# Patient Record
Sex: FEMALE | Race: WHITE | Employment: FULL TIME | URBAN - METROPOLITAN AREA
[De-identification: names, ages, dates, MRNs, and addresses within clinical notes are randomized per-mention and may not be internally consistent; named-entity substitution may affect disease eponyms.]

---

## 2017-01-12 DIAGNOSIS — Z72.0 TOBACCO ABUSE: ICD-10-CM

## 2017-01-12 RX ORDER — VARENICLINE TARTRATE 0.5 (11)-1
KIT ORAL
Qty: 53 DOSE PACK | Refills: 0 | Status: SHIPPED | OUTPATIENT
Start: 2017-01-12 | End: 2019-07-09

## 2017-01-12 NOTE — TELEPHONE ENCOUNTER
Requested Prescriptions     Pending Prescriptions Disp Refills    CHANTIX STARTING MONTH BOX 0.5 mg (11)- 1 mg (42) DsPk [Pharmacy Med Name: [address] 53 Dose Pack 0     Sig: USE AS DIRECTED IN PACKAGED INDICATIONS: SMOKING CESSATION     Last OV-12/16/16  Next OV-None scheduled   Med refilled- Dated in system for 1/17/17 going out of town requesting refill for today      Patient states she needs to get her refill for her Chantix sooner than prescription is dated in system of 1/17/17. Patient states starter kit has run out & needs approval today please as patient states she is going out of town. Please call to discuss.  Thank you

## 2017-01-12 NOTE — TELEPHONE ENCOUNTER
Patient states she needs to get her refill for her Chantix sooner than prescription is dated in system of 1/17/17. Patient states starter kit has run out & needs approval today please as patient states she is going out of town. Please call to discuss.  Thank you

## 2017-12-11 ENCOUNTER — OFFICE VISIT (OUTPATIENT)
Dept: INTERNAL MEDICINE CLINIC | Age: 47
End: 2017-12-11

## 2017-12-11 VITALS
SYSTOLIC BLOOD PRESSURE: 106 MMHG | OXYGEN SATURATION: 100 % | DIASTOLIC BLOOD PRESSURE: 69 MMHG | WEIGHT: 127.6 LBS | RESPIRATION RATE: 16 BRPM | HEIGHT: 62 IN | HEART RATE: 70 BPM | TEMPERATURE: 97.8 F | BODY MASS INDEX: 23.48 KG/M2

## 2017-12-11 DIAGNOSIS — F41.9 ANXIETY: ICD-10-CM

## 2017-12-11 DIAGNOSIS — J01.00 ACUTE NON-RECURRENT MAXILLARY SINUSITIS: Primary | ICD-10-CM

## 2017-12-11 RX ORDER — LORAZEPAM 1 MG/1
TABLET ORAL
Qty: 30 TAB | Refills: 1 | Status: SHIPPED | OUTPATIENT
Start: 2017-12-11 | End: 2019-07-09 | Stop reason: SDUPTHER

## 2017-12-11 RX ORDER — LEVOFLOXACIN 500 MG/1
500 TABLET, FILM COATED ORAL DAILY
Qty: 10 TAB | Refills: 0 | Status: SHIPPED | OUTPATIENT
Start: 2017-12-11 | End: 2019-04-29

## 2017-12-11 NOTE — PROGRESS NOTES
Chief Complaint   Patient presents with    Sinus Infection     coughing green mucus, runny nose and sneezing     Visit Vitals    /69 (BP 1 Location: Left arm, BP Patient Position: Sitting)    Pulse 70    Temp 97.8 °F (36.6 °C) (Oral)    Resp 16    Ht 5' 1.5\" (1.562 m)    Wt 127 lb 9.6 oz (57.9 kg)    LMP 11/27/2017 (Approximate)    SpO2 100%    BMI 23.72 kg/m2     Reviewed record In preparation for visit and have obtained necessary documentation    1. Have you been to the ER, urgent care clinic since your last visit? Hospitalized since your last visit? NO    2. Have you seen or consulted any other health care providers outside of the [address] [place] [address] since your last visit? Include any pap smears or colon screening. NO    Patient does not have advance directive on file and has received paperwork.

## 2017-12-11 NOTE — MR AVS SNAPSHOT
[address] [place] [address] 
[phone] Patient: Vidhi Gordillo MRN: [id number] CCB:[birth year] Visit Information Date & Time Provider Department Dept. Phone Encounter #  
 12/11/2017  9:15 AM Becky Chavez, [address] [id number] Follow-up Instructions Return if symptoms worsen or fail to improve. Upcoming Health Maintenance Date Due Pneumococcal 19-64 Medium Risk (1 of 1 - PPSV23) 8/26/1990 DTaP/Tdap/Td series (1 - Tdap) 8/26/1992 PAP AKA CERVICAL CYTOLOGY 8/26/1992 Influenza Age 5 to Adult 8/1/2017 Allergies as of 12/11/2017  Review Complete On: 12/11/2017 By: Becky Chavez MD  
  
 Severity Noted Reaction Type Reactions Benadryl [Diphenhydramine Hcl] High 01/23/2013   Side Effect Other (comments) Increase of symptoms Aspirin  09/20/2010    Nausea and Vomiting Erythromycin  09/20/2010    Hives Pcn [Penicillins]  09/20/2010    Hives Tetracycline  09/20/2010    Hives Current Immunizations  Never Reviewed No immunizations on file. Not reviewed this visit You Were Diagnosed With   
  
 Codes Comments Acute non-recurrent maxillary sinusitis    -  Primary ICD-10-CM: J01.00 ICD-9-CM: 461.0 Anxiety     ICD-10-CM: F41.9 ICD-9-CM: 300.00 Vitals BP Pulse Temp Resp Height(growth percentile) Weight(growth percentile) 106/69 (BP 1 Location: Left arm, BP Patient Position: Sitting) 70 97.8 °F (36.6 °C) (Oral) 16 5' 1.5\" (1.562 m) 127 lb 9.6 oz (57.9 kg) LMP SpO2 BMI OB Status Smoking Status 11/27/2017 (Approximate) 100% 23.72 kg/m2 Having regular periods Former Smoker BMI and BSA Data Body Mass Index Body Surface Area  
 23.72 kg/m 2 1.59 m 2 Preferred Pharmacy Pharmacy Name Phone  [address] [facility] [phone] Your Updated Medication List  
  
   
This list is accurate as of: 12/11/17  9:50 AM.  Always use your most recent med list.  
  
  
  
  
 levoFLOXacin 500 mg tablet Commonly known as:  Thersia Bring Take 1 Tab by mouth daily. LORazepam 1 mg tablet Commonly known as:  ATIVAN Take 1/2-1 tablet three times a day as needed * varenicline 0.5 mg (11)- 1 mg (42) Dspk Commonly known as:  [address] Use as directed * CHANTIX STARTING MONTH BOX 0.5 mg (11)- 1 mg (42) Dspk Generic drug:  varenicline USE AS DIRECTED IN PACKAGED INDICATIONS: SMOKING CESSATION  
  
 * Notice: This list has 2 medication(s) that are the same as other medications prescribed for you. Read the directions carefully, and ask your doctor or other care provider to review them with you. Prescriptions Printed Refills LORazepam (ATIVAN) 1 mg tablet 1 Sig: Take 1/2-1 tablet three times a day as needed Class: Print Prescriptions Sent to Pharmacy Refills  
 levoFLOXacin (LEVAQUIN) 500 mg tablet 0 Sig: Take 1 Tab by mouth daily. Class: Normal  
 Pharmacy: [facility] [address] #: [phone] Route: Oral  
  
Follow-up Instructions Return if symptoms worsen or fail to improve. Introducing [facility]! Ariel Liu introduces [employer] patient portal. Now you can access parts of your medical record, email your doctor's office, and request medication refills online. 1. In your internet browser, go to [email or web address] 2. Click on the First Time User? Click Here link in the Sign In box. You will see the New Member Sign Up page. 3. Enter your [employer] Access Code exactly as it appears below. You will not need to use this code after youve completed the sign-up process.  If you do not sign up before the expiration date, you must request a new code. · [employer] Access Code: [email or web address] Expires: 3/11/2018  9:50 AM 
 
4. Enter the last four digits of your Social Security Number (xxxx) and Date of Birth (mm/dd/yyyy) as indicated and click Submit. You will be taken to the next sign-up page. 5. Create a [employer] ID. This will be your [employer] login ID and cannot be changed, so think of one that is secure and easy to remember. 6. Create a [employer] password. You can change your password at any time. 7. Enter your Password Reset Question and Answer. This can be used at a later time if you forget your password. 8. Enter your e-mail address. You will receive e-mail notification when new information is available in [address] 9. Click Sign Up. You can now view and download portions of your medical record. 10. Click the Download Summary menu link to download a portable copy of your medical information. If you have questions, please visit the Frequently Asked Questions section of the [employer] website. Remember, [employer] is NOT to be used for urgent needs. For medical emergencies, dial 911. Now available from your iPhone and Android! Please provide this summary of care documentation to your next provider. Your primary care clinician is listed as Vonda Ridley. If you have any questions after today's visit, please call [phone].

## 2019-04-22 ENCOUNTER — TELEPHONE (OUTPATIENT)
Dept: INTERNAL MEDICINE CLINIC | Age: 49
End: 2019-04-22

## 2019-04-22 NOTE — TELEPHONE ENCOUNTER
Spoke with patient. Two pt identifiers confirmed. Patient advised that she will need to be seen before clearance can be given, has not been in the office since 12/2017. Patient offered an appointment with Dr. Antonette Beaulieu on 04/29/19. Patient accepted. Patient advised if anything changes or if unable to keep this appointment to call the office  Pt verbalized understanding of information discussed w/ no further questions at this time.

## 2019-04-22 NOTE — TELEPHONE ENCOUNTER
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 Patient reports this will not effect her medically but the person doing the massage is requiring clearance from patient's PCP. Please call to discuss.  Thank you

## 2019-04-29 ENCOUNTER — OFFICE VISIT (OUTPATIENT)
Dept: INTERNAL MEDICINE CLINIC | Age: 49
End: 2019-04-29

## 2019-04-29 VITALS
RESPIRATION RATE: 16 BRPM | HEART RATE: 64 BPM | TEMPERATURE: 97.9 F | DIASTOLIC BLOOD PRESSURE: 73 MMHG | OXYGEN SATURATION: 100 % | SYSTOLIC BLOOD PRESSURE: 115 MMHG | BODY MASS INDEX: 25.32 KG/M2 | WEIGHT: 137.6 LBS | HEIGHT: 62 IN

## 2019-04-29 DIAGNOSIS — M75.42 IMPINGEMENT SYNDROME OF LEFT SHOULDER: Primary | ICD-10-CM

## 2019-04-29 DIAGNOSIS — B36.0 TINEA VERSICOLOR: ICD-10-CM

## 2019-04-29 RX ORDER — FLUCONAZOLE 150 MG/1
TABLET ORAL
Qty: 4 TAB | Refills: 0 | Status: SHIPPED | OUTPATIENT
Start: 2019-04-29 | End: 2019-07-09

## 2019-04-29 NOTE — LETTER
NOTIFICATION RETURN TO WORK / SCHOOL 
 
4/29/2019 5:07 PM 
 
Ms. Grisel Cash [address] [id number] To Whom It May Concern: Grisel Cash is currently under the care of [facility]. She was seen in the office today for follow up. She is medically cleared for massage therapy, including muscle, fascia, lymphatic drainage massage. She is medically cleared for acupuncture and cupping therapy. If there are questions or concerns please have the patient contact our office.  
 
 
 
Sincerely, 
 
 
Payton An MD

## 2019-04-29 NOTE — PROGRESS NOTES
Homar Mcleod is a 53 y.o. female who presents for follow up. Seen at [facility], using CBD oil for pain. Had massage of the left shoulder and wants to work with massage therapist, but needed medical clearance per therapist due to her history of congenital bone, Harshad Erick syndrome. Reports fracture of left wrist with skating fall in the past. Prior skull and neck surgery. Saw ortho, Dr. Aubery Sandifer, for left shoulder pain. Has impingement syndrome. Given steroid shot last week. To start PT this week. No MRI yet. Normal monthly menses. Painful menses. Has tinea versicolor, no relief with nizoral or selenium. Past Medical History:  
Diagnosis Date  Anxiety  Polyostotic fibrous dysplasia of bone History reviewed. No pertinent family history. Social History Socioeconomic History  Marital status: SINGLE Spouse name: Not on file  Number of children: Not on file  Years of education: Not on file  Highest education level: Not on file Occupational History  Not on file Social Needs  Financial resource strain: Not on file  Food insecurity:  
  Worry: Not on file Inability: Not on file  Transportation needs:  
  Medical: Not on file Non-medical: Not on file Tobacco Use  Smoking status: Former Smoker Packs/day: 0.50 Last attempt to quit: 2017 Years since quittin.9  Smokeless tobacco: Never Used Substance and Sexual Activity  Alcohol use: Yes Alcohol/week: 0.6 oz Types: 1 Glasses of wine per week Comment: Once every two weeks  Drug use: No  
 Sexual activity: Never Partners: Male Birth control/protection: None Lifestyle  Physical activity:  
  Days per week: Not on file Minutes per session: Not on file  Stress: Not on file Relationships  Social connections:  
  Talks on phone: Not on file Gets together: Not on file Attends Hinduism service: Not on file Active member of club or organization: Not on file Attends meetings of clubs or organizations: Not on file Relationship status: Not on file  Intimate partner violence:  
  Fear of current or ex partner: Not on file Emotionally abused: Not on file Physically abused: Not on file Forced sexual activity: Not on file Other Topics Concern  Not on file Social History Narrative  Not on file Current Outpatient Medications on File Prior to Visit Medication Sig Dispense Refill  LORazepam (ATIVAN) 1 mg tablet Take 1/2-1 tablet three times a day as needed 30 Tab 1  
 CHANTIX STARTING MONTH BOX 0.5 mg (11)- 1 mg (42) DsPk USE AS DIRECTED IN PACKAGED INDICATIONS: SMOKING CESSATION 53 Dose Pack 0  
 varenicline (CHANTIX STARTING MONTH BOX) 0.5 mg (11)- 1 mg (42) DsPk Use as directed 1 Dose Pack 0 No current facility-administered medications on file prior to visit. Review of Systems Pertinent items are noted in HPI. Objective:  
 
Visit Vitals /73 (BP 1 Location: Left arm, BP Patient Position: Sitting) Pulse 64 Temp 97.9 °F (36.6 °C) (Oral) Resp 16 Ht 5' 1.5\" (1.562 m) Wt 137 lb 9.6 oz (62.4 kg) LMP 04/15/2019 (Approximate) SpO2 100% BMI 25.58 kg/m² Gen: well appearing female Resp:  No wheezing, no rhonchi, no rales. CV:  RRR, normal S1S2 Extrem:  +2 pulses, no edema, warm distally, good ROM in left shoulder without pain. Skin: hypopigmented macular eruption on chest and back. Assessment/Plan: ICD-10-CM ICD-9-CM 1. Impingement syndrome of left shoulder M75.42 726.2 2. Tinea versicolor B36.0 111.0 fluconazole (DIFLUCAN) 150 mg tablet Follow up with ortho regarding left shoulder. Given clearance letter for massage therapy.   
 
 
Hermelinda Pulido MD

## 2019-07-05 ENCOUNTER — TELEPHONE (OUTPATIENT)
Dept: INTERNAL MEDICINE CLINIC | Age: 49
End: 2019-07-05

## 2019-07-05 NOTE — TELEPHONE ENCOUNTER
Pt is requesting a call back in regards to hair falling out for a month and a half.  Best contact [phone]     Message received & copied from [facility]

## 2019-07-08 NOTE — TELEPHONE ENCOUNTER
Called and spoke to patient. Patient states that her hair has been falling out . She has lost like 25lbs and not sure if it is from that or the KEto diet she is one.

## 2019-07-09 ENCOUNTER — TELEPHONE (OUTPATIENT)
Dept: INTERNAL MEDICINE CLINIC | Age: 49
End: 2019-07-09

## 2019-07-09 ENCOUNTER — OFFICE VISIT (OUTPATIENT)
Dept: INTERNAL MEDICINE CLINIC | Age: 49
End: 2019-07-09

## 2019-07-09 VITALS
TEMPERATURE: 97.4 F | HEART RATE: 62 BPM | HEIGHT: 62 IN | RESPIRATION RATE: 16 BRPM | WEIGHT: 126 LBS | SYSTOLIC BLOOD PRESSURE: 103 MMHG | DIASTOLIC BLOOD PRESSURE: 57 MMHG | BODY MASS INDEX: 23.19 KG/M2 | OXYGEN SATURATION: 100 %

## 2019-07-09 DIAGNOSIS — L65.9 HAIR LOSS: Primary | ICD-10-CM

## 2019-07-09 DIAGNOSIS — L65.9 HAIR LOSS: ICD-10-CM

## 2019-07-09 DIAGNOSIS — E55.9 VITAMIN D DEFICIENCY: ICD-10-CM

## 2019-07-09 DIAGNOSIS — F41.9 ANXIETY: ICD-10-CM

## 2019-07-09 DIAGNOSIS — Z00.00 ROUTINE MEDICAL EXAM: Primary | ICD-10-CM

## 2019-07-09 RX ORDER — LORAZEPAM 1 MG/1
TABLET ORAL
Qty: 30 TAB | Refills: 1 | Status: SHIPPED | OUTPATIENT
Start: 2019-07-09 | End: 2020-04-09 | Stop reason: SDUPTHER

## 2019-07-09 NOTE — PROGRESS NOTES
Chelita Sierra is a 53 y.o. female who presents with concern of hair loss. Onset  1 and 1/2 months. The hair loss is diffusely. No scalp rash. no change in hair processing or change in hair care regimen. Normal monthly menses. Reports under more stress at work, 50-60 hours at work. Working weight loss 23# with keto diet and exercise. On vitamin D, biotin, b complex, multivitamin for 3 weeks. Past Medical History:   Diagnosis Date    Anxiety     Polyostotic fibrous dysplasia of bone        History reviewed. No pertinent family history. Social History     Socioeconomic History    Marital status: SINGLE     Spouse name: Not on file    Number of children: Not on file    Years of education: Not on file    Highest education level: Not on file   Occupational History    Not on file   Social Needs    Financial resource strain: Not on file    Food insecurity:     Worry: Not on file     Inability: Not on file    Transportation needs:     Medical: Not on file     Non-medical: Not on file   Tobacco Use    Smoking status: Former Smoker     Packs/day: 0.50     Last attempt to quit: 2017     Years since quittin.1    Smokeless tobacco: Never Used   Substance and Sexual Activity    Alcohol use:  Yes     Alcohol/week: 0.6 oz     Types: 1 Glasses of wine per week     Comment: Once every two weeks    Drug use: No    Sexual activity: Yes     Partners: Male     Birth control/protection: None   Lifestyle    Physical activity:     Days per week: Not on file     Minutes per session: Not on file    Stress: Not on file   Relationships    Social connections:     Talks on phone: Not on file     Gets together: Not on file     Attends Anabaptist service: Not on file     Active member of club or organization: Not on file     Attends meetings of clubs or organizations: Not on file     Relationship status: Not on file    Intimate partner violence:     Fear of current or ex partner: Not on file Emotionally abused: Not on file     Physically abused: Not on file     Forced sexual activity: Not on file   Other Topics Concern    Not on file   Social History Narrative    Not on file       No current outpatient medications on file prior to visit. No current facility-administered medications on file prior to visit. Review of Systems  Pertinent items are noted in HPI. Objective:     Visit Vitals  /57 (BP 1 Location: Left arm, BP Patient Position: Sitting)   Pulse 62   Temp 97.4 °F (36.3 °C) (Oral)   Resp 16   Ht 5' 1.5\" (1.562 m)   Wt 126 lb (57.2 kg)   LMP 07/09/2019 (Approximate)   SpO2 100%   BMI 23.42 kg/m²     Gen: well appearing female  HEENT:   Scalp hair thinning, temporal hair loss, no patchy hair loss, no scalp rash, PERRL,normal conjunctiva. OP no erythema, no exudates, MMM  Neck:  . No masses or LAD  Resp:  No wheezing, no rhonchi, no rales. CV:  RRR, normal S1S2, no murmur. Assessment/Plan:       ICD-10-CM ICD-9-CM    1. Hair loss L65.9 704.00    2. Anxiety F41.9 300.00 LORazepam (ATIVAN) 1 mg tablet   Vitamin D3 2,000 iu daily. Biotin. b complex    Follow-up and Dispositions    · Return for follow up pending labs.  Felipe Garcia MD

## 2019-07-09 NOTE — PATIENT INSTRUCTIONS
Office Policies Phone calls/patient messages: Please allow up to 24 hours for someone in the office to contact you about your call or message. Be mindful your provider may be out of the office or your message may require further review. We encourage you to use [employer] for your messages as this is a faster, more efficient way to communicate with our office Medication Refills: 
         
Prescription medications require 48-72 business hours to process. We encourage you to use [employer] for your refills. For controlled medications: Please allow 72 business hours to process. Certain medications may require you to  a written prescription at our office. NO narcotic/controlled medications will be prescribed after 4pm Monday through Friday or on weekends Form/Paperwork Completion: 
         
Please note a $25 fee may incur for all paperwork for completed by our providers. We ask that you allow 7-10 business days. Pre-payment is due prior to picking up/faxing the completed form. You may also download your forms to [employer] to have your doctor print off. Vitamin D3 2,000 iu daily. Biotin. b complex

## 2019-07-10 ENCOUNTER — TELEPHONE (OUTPATIENT)
Dept: INTERNAL MEDICINE CLINIC | Age: 49
End: 2019-07-10

## 2019-07-10 LAB
25(OH)D3+25(OH)D2 SERPL-MCNC: 41.4 NG/ML (ref 30–100)
ALBUMIN SERPL-MCNC: 4.8 G/DL (ref 3.5–5.5)
ALBUMIN/GLOB SERPL: 2.5 {RATIO} (ref 1.2–2.2)
ALP SERPL-CCNC: 59 IU/L (ref 39–117)
ALT SERPL-CCNC: 12 IU/L (ref 0–32)
APPEARANCE UR: CLEAR
AST SERPL-CCNC: 15 IU/L (ref 0–40)
BILIRUB SERPL-MCNC: 0.6 MG/DL (ref 0–1.2)
BILIRUB UR QL STRIP: NEGATIVE
BUN SERPL-MCNC: 14 MG/DL (ref 6–24)
BUN/CREAT SERPL: 24 (ref 9–23)
CALCIUM SERPL-MCNC: 9.5 MG/DL (ref 8.7–10.2)
CHLORIDE SERPL-SCNC: 105 MMOL/L (ref 96–106)
CHOLEST SERPL-MCNC: 209 MG/DL (ref 100–199)
CO2 SERPL-SCNC: 22 MMOL/L (ref 20–29)
COLOR UR: YELLOW
CREAT SERPL-MCNC: 0.59 MG/DL (ref 0.57–1)
DHEA-S SERPL-MCNC: 176.8 UG/DL (ref 41.2–243.7)
ERYTHROCYTE [DISTWIDTH] IN BLOOD BY AUTOMATED COUNT: 13.6 % (ref 12.3–15.4)
FERRITIN SERPL-MCNC: 71 NG/ML (ref 15–150)
GLOBULIN SER CALC-MCNC: 1.9 G/DL (ref 1.5–4.5)
GLUCOSE SERPL-MCNC: 93 MG/DL (ref 65–99)
GLUCOSE UR QL: NEGATIVE
HCT VFR BLD AUTO: 40.3 % (ref 34–46.6)
HDLC SERPL-MCNC: 64 MG/DL
HGB BLD-MCNC: 12.8 G/DL (ref 11.1–15.9)
HGB UR QL STRIP: NEGATIVE
KETONES UR QL STRIP: ABNORMAL
LDLC SERPL CALC-MCNC: 135 MG/DL (ref 0–99)
LEUKOCYTE ESTERASE UR QL STRIP: NEGATIVE
MCH RBC QN AUTO: 31.2 PG (ref 26.6–33)
MCHC RBC AUTO-ENTMCNC: 31.8 G/DL (ref 31.5–35.7)
MCV RBC AUTO: 98 FL (ref 79–97)
MICRO URNS: ABNORMAL
NITRITE UR QL STRIP: NEGATIVE
PH UR STRIP: 5.5 [PH] (ref 5–7.5)
PLATELET # BLD AUTO: 283 X10E3/UL (ref 150–450)
POTASSIUM SERPL-SCNC: 4.6 MMOL/L (ref 3.5–5.2)
PROT SERPL-MCNC: 6.7 G/DL (ref 6–8.5)
PROT UR QL STRIP: NEGATIVE
RBC # BLD AUTO: 4.1 X10E6/UL (ref 3.77–5.28)
SODIUM SERPL-SCNC: 144 MMOL/L (ref 134–144)
SP GR UR: 1.02 (ref 1–1.03)
T4 FREE SERPL-MCNC: 1.17 NG/DL (ref 0.82–1.77)
TESTOST FREE SERPL-MCNC: 0.7 PG/ML (ref 0–4.2)
TESTOST SERPL-MCNC: 12 NG/DL (ref 8–48)
TRIGL SERPL-MCNC: 48 MG/DL (ref 0–149)
TSH SERPL DL<=0.005 MIU/L-ACNC: 1.72 UIU/ML (ref 0.45–4.5)
UROBILINOGEN UR STRIP-MCNC: 0.2 MG/DL (ref 0.2–1)
VLDLC SERPL CALC-MCNC: 10 MG/DL (ref 5–40)
WBC # BLD AUTO: 6.1 X10E3/UL (ref 3.4–10.8)

## 2019-07-11 NOTE — TELEPHONE ENCOUNTER
Notify patient all labs normal. No sign of nutritional deficiencies or hormonal imbalance to cause hair loss. Vitamin D normal, but can continue vitamin D 2,000 iu daily to increase to higher normal range. Can continue biotin and B complex. Can use over the counter minoxidil 2% (rogaine) twice a day, can take several months to notice improvement. It may or may not be effective since the hair loss is more diffuse and not a male pattern hair loss. Avoid overprocessing.

## 2020-04-09 DIAGNOSIS — F41.9 ANXIETY: ICD-10-CM

## 2020-04-09 RX ORDER — LORAZEPAM 1 MG/1
TABLET ORAL
Qty: 30 TAB | Refills: 1 | Status: SHIPPED | OUTPATIENT
Start: 2020-04-09

## 2020-04-09 NOTE — TELEPHONE ENCOUNTER
Patient states she needs refill done for her anxiety. Last prescription  before she could use last refill. Please call if any questions. Thank you    Pharmacy is [facility], [address] indicated.

## 2020-07-07 ENCOUNTER — VIRTUAL VISIT (OUTPATIENT)
Dept: INTERNAL MEDICINE CLINIC | Age: 50
End: 2020-07-07

## 2020-07-07 DIAGNOSIS — F51.01 PRIMARY INSOMNIA: Primary | ICD-10-CM

## 2020-07-07 RX ORDER — ZOLPIDEM TARTRATE 5 MG/1
5 TABLET ORAL
Qty: 30 TAB | Refills: 1 | Status: SHIPPED | OUTPATIENT
Start: 2020-07-07

## 2020-07-07 NOTE — PATIENT INSTRUCTIONS

## 2020-07-07 NOTE — PROGRESS NOTES
Follow-up and Dispositions    · Return if symptoms worsen or fail to improve, for schedule physical and fasting labs. Diane Valadez is a 51 y.o. female who presents with concern of insomnia. Started with pandemic. More anxious. Taking lorazepam for anxiety. Has taken 1/2 lorazepam at bedtime, will fall asleep, but awakens. With 1 mg tablet, too groggy. Tried melatonin 10mg last night 30 minutes prior to bedtime, took 2 hours to fall asleep. This is an established visit conducted via telemedicine, by phone. The patient has been instructed that this meets HIPAA criteria and acknowledges and agrees to this method of visitation. Pursuant to the emergency declaration under the [facility]5 waiver authority and the [employer] Act, this Virtual Visit was conducted, with patient's consent, to reduce the patient's risk of exposure to COVID-19 and provide continuity of care for an established patient. Services were provided by phone to substitute for in-person clinic visit. Past Medical History:   Diagnosis Date    Anxiety     Polyostotic fibrous dysplasia of bone        No family history on file. Social History     Socioeconomic History    Marital status: SINGLE     Spouse name: Not on file    Number of children: Not on file    Years of education: Not on file    Highest education level: Not on file   Occupational History    Not on file   Social Needs    Financial resource strain: Not on file    Food insecurity     Worry: Not on file     Inability: Not on file    Transportation needs     Medical: Not on file     Non-medical: Not on file   Tobacco Use    Smoking status: Former Smoker     Packs/day: 0.50     Last attempt to quit: 6/1/2017     Years since quitting: 3.1    Smokeless tobacco: Never Used   Substance and Sexual Activity    Alcohol use:  Yes     Alcohol/week: 1.0 standard drinks     Types: 1 Glasses of wine per week     Comment: Once every two weeks    Drug use: No    Sexual activity: Yes     Partners: Male     Birth control/protection: None   Lifestyle    Physical activity     Days per week: Not on file     Minutes per session: Not on file    Stress: Not on file   Relationships    Social connections     Talks on phone: Not on file     Gets together: Not on file     Attends Holiness service: Not on file     Active member of club or organization: Not on file     Attends meetings of clubs or organizations: Not on file     Relationship status: Not on file    Intimate partner violence     Fear of current or ex partner: Not on file     Emotionally abused: Not on file     Physically abused: Not on file     Forced sexual activity: Not on file   Other Topics Concern    Not on file   Social History Narrative    Not on file       Current Outpatient Medications on File Prior to Visit   Medication Sig Dispense Refill    LORazepam (Ativan) 1 mg tablet Take 1/2-1 tablet three times a day as needed 30 Tab 1     No current facility-administered medications on file prior to visit. Review of Systems  Pertinent items are noted in HPI. Objective:     Gen: healthy sounding female  HEENT: no audible congestion, patient does not see oral erythema and sees MMM  Neck: patient does not feel enlarged or tender LAD or masses  Resp: normal respiratory effort, no audible wheezing. CV: patient does not feel palpitations or heart irregularity  Abd: patient does not feel abdominal tenderness or mass, patient does not notice distension  Extrem: patient does not see swelling in ankles or joints. Neuro: Alert and oriented, able to answer questions without difficulty      Assessment/Plan:       ICD-10-CM ICD-9-CM    1. Primary insomnia F51.01 307.42 zolpidem (AMBIEN) 5 mg tablet   discussed sleep hygiene, sent info. This was a telemedicine visit by phone, duration 14 minutes.          Elena Christina MD Jar[facility]    Follow-up and Dispositions    · Return if symptoms worsen or fail to improve, for schedule physical and fasting labs. Cm Rubio

## 2021-03-30 ENCOUNTER — VIRTUAL VISIT (OUTPATIENT)
Dept: INTERNAL MEDICINE CLINIC | Age: 51
End: 2021-03-30
Payer: COMMERCIAL

## 2021-03-30 DIAGNOSIS — M54.50 ACUTE BILATERAL LOW BACK PAIN WITHOUT SCIATICA: Primary | ICD-10-CM

## 2021-03-30 PROCEDURE — 99213 OFFICE O/P EST LOW 20 MIN: CPT | Performed by: FAMILY MEDICINE

## 2021-03-30 RX ORDER — IBUPROFEN 600 MG/1
TABLET ORAL
COMMUNITY

## 2021-03-30 RX ORDER — METHYLPREDNISOLONE 4 MG/1
4 TABLET ORAL
Qty: 1 DOSE PACK | Refills: 0 | Status: SHIPPED | OUTPATIENT
Start: 2021-03-30

## 2021-03-30 RX ORDER — CYCLOBENZAPRINE HCL 5 MG
5-10 TABLET ORAL
Qty: 20 TAB | Refills: 0 | Status: SHIPPED | OUTPATIENT
Start: 2021-03-30

## 2021-03-30 NOTE — PROGRESS NOTES
Ruth Keen is a 53 y.o. female who presents with concern of low back pain. The patient states she was exercising and she injured her back. Onset 4-5 days ago. Reports pain in lower back bilaterally. No sciatica. Tried ibuprofen- no relief, stretching- some relief. Worse with sitting for work. This is an established visit conducted via telemedicine with video. The patient has been instructed that this meets HIPAA criteria and acknowledges and agrees to this method of visitation. Pursuant to the emergency declaration under the [place], [facility] waiver authority and the [employer] Act, this Virtual Visit was conducted, with patient's consent, to reduce the patient's risk of exposure to COVID-19 and provide continuity of care for an established patient. Services were provided through a video synchronous discussion virtually to substitute for in-person clinic visit. Past Medical History:   Diagnosis Date    Anxiety     Polyostotic fibrous dysplasia of bone        History reviewed. No pertinent family history. Social History     Socioeconomic History    Marital status: SINGLE     Spouse name: Not on file    Number of children: Not on file    Years of education: Not on file    Highest education level: Not on file   Occupational History    Not on file   Social Needs    Financial resource strain: Not on file    Food insecurity     Worry: Not on file     Inability: Not on file    Transportation needs     Medical: Not on file     Non-medical: Not on file   Tobacco Use    Smoking status: Former Smoker     Packs/day: 0.50     Years: 15.00     Pack years: 7.50     Types: Cigarettes     Quit date: 6/1/2017     Years since quitting: 3.8    Smokeless tobacco: Never Used   Substance and Sexual Activity    Alcohol use:  Yes     Alcohol/week: 1.0 standard drinks     Types: 1 Glasses of wine per week Comment: Once every two weeks    Drug use: No    Sexual activity: Yes     Partners: Male     Birth control/protection: None   Lifestyle    Physical activity     Days per week: Not on file     Minutes per session: Not on file    Stress: Not on file   Relationships    Social connections     Talks on phone: Not on file     Gets together: Not on file     Attends Confucianism service: Not on file     Active member of club or organization: Not on file     Attends meetings of clubs or organizations: Not on file     Relationship status: Not on file    Intimate partner violence     Fear of current or ex partner: Not on file     Emotionally abused: Not on file     Physically abused: Not on file     Forced sexual activity: Not on file   Other Topics Concern    Not on file   Social History Narrative    Not on file       Current Outpatient Medications on File Prior to Visit   Medication Sig Dispense Refill    ibuprofen (MOTRIN) 600 mg tablet Take  by mouth every six (6) hours as needed for Pain.  zolpidem (AMBIEN) 5 mg tablet Take 1 Tab by mouth nightly as needed for Sleep. Max Daily Amount: 5 mg. 30 Tab 1    LORazepam (Ativan) 1 mg tablet Take 1/2-1 tablet three times a day as needed 30 Tab 1     No current facility-administered medications on file prior to visit. Review of Systems  Pertinent items are noted in HPI. Objective:     Gen: well appearing female  Resp: normal respiratory effort, no audible wheezing. CV: patient does not feel palpitations or heart irregularity  Neuro: Alert and oriented, able to answer questions without difficulty, able to walk without difficulty  Back-low back pain with movement and prolonged sitting    Assessment/Plan:       ICD-10-CM ICD-9-CM    1. Acute bilateral low back pain without sciatica  M54.5 724.2 methylPREDNISolone (MEDROL DOSEPACK) 4 mg tablet     338.19 cyclobenzaprine (FLEXERIL) 5 mg tablet   Recommend low back stretches. Use heat.  Steroid course and muscle relaxant given. This was a telemedicine visit with video.         Deon Raphael MD

## 2021-04-24 ENCOUNTER — IMMUNIZATION (OUTPATIENT)
Dept: INTERNAL MEDICINE CLINIC | Age: 51
End: 2021-04-24
Payer: COMMERCIAL

## 2021-04-24 DIAGNOSIS — Z23 ENCOUNTER FOR IMMUNIZATION: Primary | ICD-10-CM

## 2021-04-24 PROCEDURE — 91300 COVID-19, MRNA, LNP-S, PF, 30MCG/0.3ML DOSE(PFIZER): CPT | Performed by: FAMILY MEDICINE

## 2021-04-24 PROCEDURE — 0001A COVID-19, MRNA, LNP-S, PF, 30MCG/0.3ML DOSE(PFIZER): CPT | Performed by: FAMILY MEDICINE

## 2021-05-15 ENCOUNTER — IMMUNIZATION (OUTPATIENT)
Dept: INTERNAL MEDICINE CLINIC | Age: 51
End: 2021-05-15
Payer: COMMERCIAL

## 2021-05-15 DIAGNOSIS — Z23 ENCOUNTER FOR IMMUNIZATION: Primary | ICD-10-CM

## 2021-05-15 PROCEDURE — 91300 COVID-19, MRNA, LNP-S, PF, 30MCG/0.3ML DOSE(PFIZER): CPT | Performed by: FAMILY MEDICINE

## 2021-05-15 PROCEDURE — 0002A COVID-19, MRNA, LNP-S, PF, 30MCG/0.3ML DOSE(PFIZER): CPT | Performed by: FAMILY MEDICINE

## 2022-06-15 ENCOUNTER — HOSPITAL ENCOUNTER (OUTPATIENT)
Dept: MAMMOGRAPHY | Age: 52
Discharge: HOME | End: 2022-06-15
Payer: COMMERCIAL

## 2022-06-15 ENCOUNTER — TRANSCRIBE ORDER (OUTPATIENT)
Dept: GENERAL RADIOLOGY | Age: 52
End: 2022-06-15

## 2022-06-15 DIAGNOSIS — Z12.31 BREAST CANCER SCREENING BY MAMMOGRAM: Primary | ICD-10-CM

## 2022-06-15 DIAGNOSIS — Z12.31 BREAST CANCER SCREENING BY MAMMOGRAM: ICD-10-CM

## 2022-06-15 PROCEDURE — 77067 SCR MAMMO BI INCL CAD: CPT

## 2022-10-31 ENCOUNTER — OFFICE VISIT (OUTPATIENT)
Dept: INTERNAL MEDICINE CLINIC | Age: 52
End: 2022-10-31
Payer: COMMERCIAL

## 2022-10-31 VITALS
TEMPERATURE: 98.1 F | HEIGHT: 61 IN | HEART RATE: 73 BPM | RESPIRATION RATE: 16 BRPM | DIASTOLIC BLOOD PRESSURE: 73 MMHG | OXYGEN SATURATION: 97 % | WEIGHT: 126 LBS | SYSTOLIC BLOOD PRESSURE: 114 MMHG | BODY MASS INDEX: 23.79 KG/M2

## 2022-10-31 DIAGNOSIS — R35.0 FREQUENCY OF URINATION: Primary | ICD-10-CM

## 2022-10-31 DIAGNOSIS — N30.00 ACUTE CYSTITIS WITHOUT HEMATURIA: ICD-10-CM

## 2022-10-31 DIAGNOSIS — R39.15 URGENCY OF URINATION: ICD-10-CM

## 2022-10-31 LAB
BILIRUB UR QL STRIP: NEGATIVE
GLUCOSE UR-MCNC: NEGATIVE MG/DL
KETONES P FAST UR STRIP-MCNC: NEGATIVE MG/DL
PH UR STRIP: 5.5 [PH] (ref 4.6–8)
PROT UR QL STRIP: NEGATIVE
SP GR UR STRIP: 1 (ref 1–1.03)
UA UROBILINOGEN AMB POC: NORMAL (ref 0.2–1)
URINALYSIS CLARITY POC: CLEAR
URINALYSIS COLOR POC: NORMAL
URINE BLOOD POC: NORMAL
URINE LEUKOCYTES POC: NEGATIVE
URINE NITRITES POC: NEGATIVE

## 2022-10-31 PROCEDURE — 81002 URINALYSIS NONAUTO W/O SCOPE: CPT | Performed by: STUDENT IN AN ORGANIZED HEALTH CARE EDUCATION/TRAINING PROGRAM

## 2022-10-31 PROCEDURE — 99213 OFFICE O/P EST LOW 20 MIN: CPT | Performed by: STUDENT IN AN ORGANIZED HEALTH CARE EDUCATION/TRAINING PROGRAM

## 2022-10-31 RX ORDER — NITROFURANTOIN 25; 75 MG/1; MG/1
100 CAPSULE ORAL 2 TIMES DAILY
Qty: 10 CAPSULE | Refills: 0 | Status: SHIPPED | OUTPATIENT
Start: 2022-10-31

## 2022-10-31 NOTE — PROGRESS NOTES
1. \"Have you been to the ER, urgent care clinic since your last visit? Hospitalized since your last visit? \" Yes  Back pain     2. \"Have you seen or consulted any other health care providers outside of the [address] [place] [address] since your last visit? \" No     3. For patients aged 39-70: Has the patient had a colonoscopy / FIT/ Cologuard? No      If the patient is female:    4. For patients aged 41-77: Has the patient had a mammogram within the past 2 years? Yes - Care Gap present. Most recent result on file      5. For patients aged 21-65: Has the patient had a pap smear?  No

## 2022-10-31 NOTE — PROGRESS NOTES
[facility]   Internal Medicine  [address], [address] [facility]  [address]  [place], [address] [facility] [facility]  [phone]      Primary Care Visit Note    Assessment/Plan:     Diagnoses and all orders for this visit:    1. Frequency of urination  2. Urgency of urination  3. Acute cystitis without hematuria  -     AMB POC URINALYSIS DIP STICK MANUAL W/O MICRO - was negative in the office but positive on her home test  -     nitrofurantoin, macrocrystal-monohydrate, (MACROBID) 100 mg capsule; Take 1 Capsule by mouth two (2) times a day. -     CULTURE, URINE; Future    Gustavo Miguel MD    CC:     Chief Complaint   Patient presents with    Urinary Frequency       HPI:     Td Blair is a 47 y.o. female who presents for evaluation of urinary symptoms. - pt experiencing burning with urination, frequency since this morning.  - she has had UTIs in the past and she is certain re the sx  - no vaginal discharge, she does not have yeast infection sx  - no change in urine odor, color, no blood  - She is not currently sexually active. - she took a home UA kit which was positive      ROS:   All 10 point review of systems negative except those mentioned in the HPI. Past Medical History: Active Ambulatory Problems     Diagnosis Date Noted    Anxiety 09/04/2015     Resolved Ambulatory Problems     Diagnosis Date Noted    No Resolved Ambulatory Problems     Past Medical History:   Diagnosis Date    Polyostotic fibrous dysplasia of bone           Current Medications:     Current Outpatient Medications:     LORazepam (Ativan) 1 mg tablet, Take 1/2-1 tablet three times a day as needed, Disp: 30 Tab, Rfl: 1    ibuprofen (MOTRIN) 600 mg tablet, Take  by mouth every six (6) hours as needed for Pain., Disp: , Rfl:     methylPREDNISolone (MEDROL DOSEPACK) 4 mg tablet, Take 1 Tab by mouth Specific Days and Specific Times. , Disp: 1 Dose Pack, Rfl: 0    cyclobenzaprine (FLEXERIL) 5 mg tablet, Take 1-2 Tabs by mouth nightly. As needed for muscle spasm, Disp: 20 Tab, Rfl: 0    zolpidem (AMBIEN) 5 mg tablet, Take 1 Tab by mouth nightly as needed for Sleep. Max Daily Amount: 5 mg., Disp: 30 Tab, Rfl: 1      Past Surgical History:     Past Surgical History:   Procedure Laterality Date    HX ORTHOPAEDIC  1998    neck    HX ORTHOPAEDIC      metal plate in head fibrous dysplasia    HX OTHER SURGICAL      skull surgery with metal plate    IMPLANT BREAST SILICONE/EQ Bilateral 8252         Family History:   History reviewed. No pertinent family history. Social History:     Social History     Socioeconomic History    Marital status: SINGLE     Spouse name: Not on file    Number of children: Not on file    Years of education: Not on file    Highest education level: Not on file   Occupational History    Not on file   Tobacco Use    Smoking status: Former     Packs/day: 0.50     Years: 15.00     Pack years: 7.50     Types: Cigarettes     Quit date: 2017     Years since quittin.4    Smokeless tobacco: Never   Vaping Use    Vaping Use: Never used   Substance and Sexual Activity    Alcohol use:  Yes     Alcohol/week: 1.0 standard drink     Types: 1 Glasses of wine per week     Comment: Once every two weeks    Drug use: No    Sexual activity: Yes     Partners: Male     Birth control/protection: None   Other Topics Concern    Not on file   Social History Narrative    Not on file     Social Determinants of Health     Financial Resource Strain: Low Risk     Difficulty of Paying Living Expenses: Not very hard   Food Insecurity: No Food Insecurity    Worried About Running Out of Food in the Last Year: Never true    Ran Out of Food in the Last Year: Never true   Transportation Needs: Not on file   Physical Activity: Not on file   Stress: Not on file   Social Connections: Not on file   Intimate Partner Violence: Not on file   Housing Stability: Not on file            Visit Vitals  /73   Pulse 73 Temp 98.1 °F (36.7 °C) (Temporal)   Resp 16   Ht 5' 1\" (1.549 m)   Wt 126 lb (57.2 kg)   SpO2 97%   BMI 23.81 kg/m²       Physical Exam:   General - Well appearing female  HEENT - eomi, non-icteric sclera  Pulm - clear to auscultation bilaterally  Cardio - RRR, normal S1 S2, no murmur  Abd - soft, nontender, no masses, nabs, no CVA tenderness  Extrem - no edema  Neuro-  Alert and oriented, No focal deficits           Labs/Imaging:     Labs and imaging reviewed by me and significant for:    Lab Results   Component Value Date/Time    WBC 6.1 07/09/2019 10:07 AM    Hemoglobin (POC) 13.6 01/23/2013 08:43 AM    HGB 12.8 07/09/2019 10:07 AM    Hematocrit (POC) 40 01/23/2013 08:43 AM    HCT 40.3 07/09/2019 10:07 AM    PLATELET 651 68/64/0261 10:07 AM    MCV 98 (H) 07/09/2019 10:07 AM     Lab Results   Component Value Date/Time    Sodium 144 07/09/2019 10:07 AM    Potassium 4.6 07/09/2019 10:07 AM    Chloride 105 07/09/2019 10:07 AM    CO2 22 07/09/2019 10:07 AM    Glucose 93 07/09/2019 10:07 AM    BUN 14 07/09/2019 10:07 AM    Creatinine 0.59 07/09/2019 10:07 AM    BUN/Creatinine ratio 24 (H) 07/09/2019 10:07 AM    GFR est  07/09/2019 10:07 AM    GFR est non- 07/09/2019 10:07 AM    Calcium 9.5 07/09/2019 10:07 AM    Bilirubin, total 0.6 07/09/2019 10:07 AM    Alk.  phosphatase 59 07/09/2019 10:07 AM    Protein, total 6.7 07/09/2019 10:07 AM    Albumin 4.8 07/09/2019 10:07 AM    A-G Ratio 2.5 (H) 07/09/2019 10:07 AM    ALT (SGPT) 12 07/09/2019 10:07 AM    AST (SGOT) 15 07/09/2019 10:07 AM     No results found for: HBA1C, JLP5ORCW, PZB2EDVS  Lab Results   Component Value Date/Time    TSH 1.720 07/09/2019 10:07 AM

## 2022-11-02 LAB
BACTERIA SPEC CULT: ABNORMAL
CC UR VC: ABNORMAL
SERVICE CMNT-IMP: ABNORMAL

## 2024-09-14 ENCOUNTER — HOSPITAL ENCOUNTER (EMERGENCY)
Facility: HOSPITAL | Age: 54
Discharge: HOME | End: 2024-09-14
Attending: STUDENT IN AN ORGANIZED HEALTH CARE EDUCATION/TRAINING PROGRAM
Payer: COMMERCIAL

## 2024-09-14 ENCOUNTER — APPOINTMENT (OUTPATIENT)
Facility: HOSPITAL | Age: 54
End: 2024-09-14
Payer: COMMERCIAL

## 2024-09-14 VITALS
HEART RATE: 79 BPM | OXYGEN SATURATION: 97 % | TEMPERATURE: 97.9 F | RESPIRATION RATE: 17 BRPM | DIASTOLIC BLOOD PRESSURE: 79 MMHG | WEIGHT: 130 LBS | SYSTOLIC BLOOD PRESSURE: 141 MMHG | BODY MASS INDEX: 24.55 KG/M2 | HEIGHT: 61 IN

## 2024-09-14 DIAGNOSIS — D25.9 UTERINE LEIOMYOMA, UNSPECIFIED LOCATION: ICD-10-CM

## 2024-09-14 DIAGNOSIS — N93.9 VAGINAL BLEEDING: Primary | ICD-10-CM

## 2024-09-14 LAB
ABO + RH BLD: NORMAL
ALBUMIN SERPL-MCNC: 3.9 G/DL (ref 3.5–5)
ALBUMIN/GLOB SERPL: 1.1 (ref 1.1–2.2)
ALP SERPL-CCNC: 74 U/L (ref 45–117)
ALT SERPL-CCNC: 19 U/L (ref 12–78)
ANION GAP SERPL CALC-SCNC: 9 MMOL/L (ref 2–12)
APPEARANCE UR: CLEAR
AST SERPL-CCNC: 19 U/L (ref 15–37)
BACTERIA URNS QL MICRO: NEGATIVE /HPF
BASOPHILS # BLD: 0.1 K/UL (ref 0–0.1)
BASOPHILS NFR BLD: 1 % (ref 0–1)
BILIRUB SERPL-MCNC: 0.4 MG/DL (ref 0.2–1)
BILIRUB UR QL: NEGATIVE
BLOOD GROUP ANTIBODIES SERPL: NORMAL
BUN SERPL-MCNC: 11 MG/DL (ref 6–20)
BUN/CREAT SERPL: 18 (ref 12–20)
CALCIUM SERPL-MCNC: 8.3 MG/DL (ref 8.5–10.1)
CHLORIDE SERPL-SCNC: 108 MMOL/L (ref 97–108)
CO2 SERPL-SCNC: 20 MMOL/L (ref 21–32)
COLOR UR: ABNORMAL
CREAT SERPL-MCNC: 0.61 MG/DL (ref 0.55–1.02)
DIFFERENTIAL METHOD BLD: ABNORMAL
EOSINOPHIL # BLD: 0.3 K/UL (ref 0–0.4)
EOSINOPHIL NFR BLD: 3 % (ref 0–7)
EPITH CASTS URNS QL MICRO: ABNORMAL /LPF
ERYTHROCYTE [DISTWIDTH] IN BLOOD BY AUTOMATED COUNT: 12.1 % (ref 11.5–14.5)
GLOBULIN SER CALC-MCNC: 3.4 G/DL (ref 2–4)
GLUCOSE SERPL-MCNC: 75 MG/DL (ref 65–100)
GLUCOSE UR STRIP.AUTO-MCNC: NEGATIVE MG/DL
HCT VFR BLD AUTO: 36.4 % (ref 35–47)
HGB BLD-MCNC: 12.6 G/DL (ref 11.5–16)
HGB UR QL STRIP: ABNORMAL
IMM GRANULOCYTES # BLD AUTO: 0 K/UL (ref 0–0.04)
IMM GRANULOCYTES NFR BLD AUTO: 0 % (ref 0–0.5)
KETONES UR QL STRIP.AUTO: NEGATIVE MG/DL
LEUKOCYTE ESTERASE UR QL STRIP.AUTO: ABNORMAL
LIPASE SERPL-CCNC: 17 U/L (ref 13–75)
LYMPHOCYTES # BLD: 1.9 K/UL (ref 0.8–3.5)
LYMPHOCYTES NFR BLD: 17 % (ref 12–49)
MCH RBC QN AUTO: 32.3 PG (ref 26–34)
MCHC RBC AUTO-ENTMCNC: 34.6 G/DL (ref 30–36.5)
MCV RBC AUTO: 93.3 FL (ref 80–99)
MONOCYTES # BLD: 0.8 K/UL (ref 0–1)
MONOCYTES NFR BLD: 7 % (ref 5–13)
NEUTS SEG # BLD: 8.2 K/UL (ref 1.8–8)
NEUTS SEG NFR BLD: 72 % (ref 32–75)
NITRITE UR QL STRIP.AUTO: NEGATIVE
NRBC # BLD: 0 K/UL (ref 0–0.01)
NRBC BLD-RTO: 0 PER 100 WBC
PH UR STRIP: 5.5 (ref 5–8)
PLATELET # BLD AUTO: 235 K/UL (ref 150–400)
PMV BLD AUTO: 10.5 FL (ref 8.9–12.9)
POTASSIUM SERPL-SCNC: 3.5 MMOL/L (ref 3.5–5.1)
PROT SERPL-MCNC: 7.3 G/DL (ref 6.4–8.2)
PROT UR STRIP-MCNC: 30 MG/DL
RBC # BLD AUTO: 3.9 M/UL (ref 3.8–5.2)
RBC #/AREA URNS HPF: ABNORMAL /HPF (ref 0–5)
SODIUM SERPL-SCNC: 137 MMOL/L (ref 136–145)
SP GR UR REFRACTOMETRY: <1.005 (ref 1–1.03)
SPECIMEN EXP DATE BLD: NORMAL
SPECIMEN HOLD: NORMAL
UROBILINOGEN UR QL STRIP.AUTO: 0.2 EU/DL (ref 0.2–1)
WBC # BLD AUTO: 11.3 K/UL (ref 3.6–11)
WBC URNS QL MICRO: ABNORMAL /HPF (ref 0–4)

## 2024-09-14 PROCEDURE — 36415 COLL VENOUS BLD VENIPUNCTURE: CPT

## 2024-09-14 PROCEDURE — 85025 COMPLETE CBC W/AUTO DIFF WBC: CPT

## 2024-09-14 PROCEDURE — 83690 ASSAY OF LIPASE: CPT

## 2024-09-14 PROCEDURE — 86900 BLOOD TYPING SEROLOGIC ABO: CPT

## 2024-09-14 PROCEDURE — 96374 THER/PROPH/DIAG INJ IV PUSH: CPT

## 2024-09-14 PROCEDURE — 94761 N-INVAS EAR/PLS OXIMETRY MLT: CPT

## 2024-09-14 PROCEDURE — 81001 URINALYSIS AUTO W/SCOPE: CPT

## 2024-09-14 PROCEDURE — 76830 TRANSVAGINAL US NON-OB: CPT

## 2024-09-14 PROCEDURE — 6360000002 HC RX W HCPCS

## 2024-09-14 PROCEDURE — 80053 COMPREHEN METABOLIC PANEL: CPT

## 2024-09-14 PROCEDURE — 2580000003 HC RX 258

## 2024-09-14 PROCEDURE — 86901 BLOOD TYPING SEROLOGIC RH(D): CPT

## 2024-09-14 PROCEDURE — 99284 EMERGENCY DEPT VISIT MOD MDM: CPT

## 2024-09-14 PROCEDURE — 86850 RBC ANTIBODY SCREEN: CPT

## 2024-09-14 RX ORDER — MORPHINE SULFATE 2 MG/ML
2 INJECTION, SOLUTION INTRAMUSCULAR; INTRAVENOUS
Status: COMPLETED | OUTPATIENT
Start: 2024-09-14 | End: 2024-09-14

## 2024-09-14 RX ORDER — ONDANSETRON 4 MG/1
4 TABLET, ORALLY DISINTEGRATING ORAL 3 TIMES DAILY PRN
Qty: 21 TABLET | Refills: 0 | Status: SHIPPED | OUTPATIENT
Start: 2024-09-14

## 2024-09-14 RX ORDER — MEDROXYPROGESTERONE ACETATE 10 MG
10 TABLET ORAL 2 TIMES DAILY
Qty: 20 TABLET | Refills: 0 | Status: SHIPPED | OUTPATIENT
Start: 2024-09-14 | End: 2024-09-24

## 2024-09-14 RX ORDER — 0.9 % SODIUM CHLORIDE 0.9 %
1000 INTRAVENOUS SOLUTION INTRAVENOUS ONCE
Status: COMPLETED | OUTPATIENT
Start: 2024-09-14 | End: 2024-09-14

## 2024-09-14 RX ADMIN — MORPHINE SULFATE 2 MG: 2 INJECTION, SOLUTION INTRAMUSCULAR; INTRAVENOUS at 10:05

## 2024-09-14 RX ADMIN — SODIUM CHLORIDE 1000 ML: 9 INJECTION, SOLUTION INTRAVENOUS at 10:03

## 2024-09-14 ASSESSMENT — PAIN DESCRIPTION - DESCRIPTORS: DESCRIPTORS: CRAMPING

## 2024-09-14 ASSESSMENT — PAIN - FUNCTIONAL ASSESSMENT: PAIN_FUNCTIONAL_ASSESSMENT: 0-10

## 2024-09-14 ASSESSMENT — PAIN DESCRIPTION - LOCATION: LOCATION: ABDOMEN

## 2024-09-14 ASSESSMENT — PAIN SCALES - GENERAL: PAINLEVEL_OUTOF10: 7

## 2024-09-17 ENCOUNTER — HOSPITAL ENCOUNTER (OUTPATIENT)
Facility: HOSPITAL | Age: 54
Setting detail: SPECIMEN
Discharge: HOME | End: 2024-09-20

## 2024-09-17 ENCOUNTER — OFFICE VISIT (OUTPATIENT)
Age: 54
End: 2024-09-17

## 2024-09-17 VITALS
DIASTOLIC BLOOD PRESSURE: 81 MMHG | SYSTOLIC BLOOD PRESSURE: 150 MMHG | HEART RATE: 73 BPM | BODY MASS INDEX: 24.92 KG/M2 | WEIGHT: 132 LBS | HEIGHT: 61 IN

## 2024-09-17 DIAGNOSIS — N92.6 IRREGULAR BLEEDING: Primary | ICD-10-CM

## 2024-09-17 DIAGNOSIS — N93.9 ABNORMAL UTERINE BLEEDING: ICD-10-CM

## 2024-09-17 RX ORDER — MEDROXYPROGESTERONE ACETATE 10 MG
10 TABLET ORAL 2 TIMES DAILY
Qty: 60 TABLET | Refills: 3 | Status: SHIPPED | OUTPATIENT
Start: 2024-09-17

## 2024-09-23 LAB
CYTOLOGIST CVX/VAG CYTO: ABNORMAL
CYTOLOGY CVX/VAG DOC CYTO: ABNORMAL
CYTOLOGY CVX/VAG DOC THIN PREP: ABNORMAL
DX ICD CODE: ABNORMAL
HPV GENOTYPE REFLEX: ABNORMAL
HPV I/H RISK 4 DNA CVX QL PROBE+SIG AMP: POSITIVE
Lab: ABNORMAL
Lab: ABNORMAL
OTHER STN SPEC: ABNORMAL
STAT OF ADQ CVX/VAG CYTO-IMP: ABNORMAL

## 2024-10-15 ENCOUNTER — PREP FOR PROCEDURE (OUTPATIENT)
Facility: HOSPITAL | Age: 54
End: 2024-10-15

## 2024-10-15 ENCOUNTER — OFFICE VISIT (OUTPATIENT)
Age: 54
End: 2024-10-15
Payer: COMMERCIAL

## 2024-10-15 VITALS
WEIGHT: 131 LBS | HEIGHT: 61 IN | SYSTOLIC BLOOD PRESSURE: 148 MMHG | HEART RATE: 74 BPM | BODY MASS INDEX: 24.73 KG/M2 | DIASTOLIC BLOOD PRESSURE: 79 MMHG

## 2024-10-15 DIAGNOSIS — N93.9 ABNORMAL UTERINE BLEEDING: ICD-10-CM

## 2024-10-15 DIAGNOSIS — Z12.4 CERVICAL CANCER SCREENING: Primary | ICD-10-CM

## 2024-10-15 PROBLEM — D25.9 UTERINE FIBROID: Status: ACTIVE | Noted: 2024-10-15

## 2024-10-15 PROCEDURE — 99214 OFFICE O/P EST MOD 30 MIN: CPT | Performed by: STUDENT IN AN ORGANIZED HEALTH CARE EDUCATION/TRAINING PROGRAM

## 2024-10-15 RX ORDER — MEGESTROL ACETATE 40 MG/1
40 TABLET ORAL DAILY
Qty: 30 TABLET | Refills: 3 | Status: SHIPPED | OUTPATIENT
Start: 2024-10-15

## 2024-10-15 ASSESSMENT — PATIENT HEALTH QUESTIONNAIRE - PHQ9
2. FEELING DOWN, DEPRESSED OR HOPELESS: NOT AT ALL
SUM OF ALL RESPONSES TO PHQ QUESTIONS 1-9: 0
SUM OF ALL RESPONSES TO PHQ QUESTIONS 1-9: 0
1. LITTLE INTEREST OR PLEASURE IN DOING THINGS: NOT AT ALL
SUM OF ALL RESPONSES TO PHQ QUESTIONS 1-9: 0
SUM OF ALL RESPONSES TO PHQ9 QUESTIONS 1 & 2: 0
SUM OF ALL RESPONSES TO PHQ QUESTIONS 1-9: 0

## 2024-10-15 NOTE — PROGRESS NOTES
Pamela Olivera is a 54 y.o. female presents for a problem visit.    Chief Complaint   Patient presents with    Follow-up     AUB     Patient's last menstrual period was 10/09/2024 (exact date).  Birth Control: none.  Last Pap: abnormal obtained 9/17/2024.    The patient is reporting having: the medication did not change anything, still heavy menses and painful  Not sexual active now      Examination chaperoned by Lidya Gracia MA.  
1 month ago with positive HPV, recollected today.  Benign EMB 1 month ago.  Hemoglobin 12.1 month ago.  Failed Provera, switching to Megace 40 mg daily.  Patient interested in definitive management with hysterectomy.  Discussed over 50 years old, guidelines recommend opportunistic BSO.  Can consider HRT as she is premenopausal.  Will decide postoperatively.  Will perform robotic assisted TLH BSO, cystoscopy as discussed.  Discussed risk benefits and alternatives.  Discussed possible conversion to open procedure if unable to proceed laparoscopically.  Will follow-up Pap and perform preoperative colposcopy as needed.  RTO prn if symptoms persist or worsen.  Instructions given to pt.  Handouts given to pt.    MD Eliu Heredia OB/Gyn

## 2024-10-22 LAB
CYTOLOGIST CVX/VAG CYTO: NORMAL
CYTOLOGY CVX/VAG DOC CYTO: NORMAL
CYTOLOGY CVX/VAG DOC THIN PREP: NORMAL
DX ICD CODE: NORMAL
HPV GENOTYPE REFLEX: NORMAL
HPV I/H RISK 4 DNA CVX QL PROBE+SIG AMP: NEGATIVE
Lab: NORMAL
OTHER STN SPEC: NORMAL
STAT OF ADQ CVX/VAG CYTO-IMP: NORMAL

## 2024-11-06 ENCOUNTER — PREP FOR PROCEDURE (OUTPATIENT)
Facility: HOSPITAL | Age: 54
End: 2024-11-06

## 2024-11-06 DIAGNOSIS — N93.9 ABNORMAL UTERINE BLEEDING: Primary | ICD-10-CM

## 2024-11-06 DIAGNOSIS — D25.9 UTERINE LEIOMYOMA, UNSPECIFIED LOCATION: ICD-10-CM

## 2024-11-13 ENCOUNTER — TELEPHONE (OUTPATIENT)
Age: 54
End: 2024-11-13

## 2024-11-13 NOTE — TELEPHONE ENCOUNTER
PT name and  verified      53 yo last ov 10/15/24, surgery scheduled for 24    PT is calling stating the medication she was prescribed and has been taking 10/25 megestrol (MEGACE) 40 MG tablet , has been working and her period was\"nothing like it had been\" and she wants to give it another month to see if the medication continues to work, to maybe prevent from having surgery.  PT is wanting to postpone, but wants to talk to MD before confirming.    Please review/contact PT    Thank you

## 2024-11-15 ENCOUNTER — TELEPHONE (OUTPATIENT)
Age: 54
End: 2024-11-15

## 2024-11-15 NOTE — TELEPHONE ENCOUNTER
Two patient identifiers used    54 year old patient last seen in the office on 10/15/2024.  Patient currently scheduled for surgery   Surgeon Role   Cyndee Finn MD Primary   Néstor Serra MD Assisting    Procedure Laterality Anesthesia   ROBOTIC ASSISTED TOTAL LAPAROSCOPIC HYSTERECTOMY , BILATERAL SALPINGECTOMY, CYSTOSCOPY         On 12/5/2024      Patient has been taking megace 40 mg daily since 10/15/2024 and reports her cycle that started last Friday has been significantly improved from previous cycle .    Patient reports she is currently just spotting. Patient reports she has been totally functional through out the cycle.    Patient reports flow and pain level greatly improved.    Patient is wanting to discuss whether to post pone surgery, ( get through holidays) , or can she get to menopause taking this medication.    Patient would like MD to call her back    ? Does she need office visit to discuss.    Patient is very pleased with how medication has helped.        Please advise  Thank you

## 2024-11-18 NOTE — TELEPHONE ENCOUNTER
Two patient identifiers used        Patient calling back to say that her regular painful heavy cycle started on Friday night 11/15/2024.    Patient reports same symptoms of painful( \"horrible\") heavy vaginal bleeding.    Patient reports changing her pad every 15 minutes from 2 am -5 am today and taking ibuprofen.    Patient reports she is now changing her pad every hour and is not feeling faint or dizzy.    Patient dalton not want to cancel surgery and has already began the preadmission testing process.    TASHI

## 2024-11-18 NOTE — TELEPHONE ENCOUNTER
Cyndee Finn MD   to Me       11/18/24 12:56 PM  Thank you, lets just ensure she has bleeding precautions and that she's still taking her megace    This nurse called the patient back to review pain and bleeding precautions and patient is taking the megace.        Patient verbalized understanding.

## 2024-11-21 ENCOUNTER — HOSPITAL ENCOUNTER (OUTPATIENT)
Facility: HOSPITAL | Age: 54
Discharge: HOME | End: 2024-11-21
Payer: COMMERCIAL

## 2024-11-21 VITALS
DIASTOLIC BLOOD PRESSURE: 74 MMHG | HEART RATE: 69 BPM | HEIGHT: 62 IN | BODY MASS INDEX: 24.11 KG/M2 | WEIGHT: 131 LBS | SYSTOLIC BLOOD PRESSURE: 125 MMHG | RESPIRATION RATE: 18 BRPM | TEMPERATURE: 97.1 F | OXYGEN SATURATION: 100 %

## 2024-11-21 LAB
ABO + RH BLD: NORMAL
BASOPHILS # BLD: 0.1 K/UL (ref 0–0.1)
BASOPHILS NFR BLD: 1 % (ref 0–1)
BLOOD GROUP ANTIBODIES SERPL: NORMAL
DIFFERENTIAL METHOD BLD: ABNORMAL
EOSINOPHIL # BLD: 0.2 K/UL (ref 0–0.4)
EOSINOPHIL NFR BLD: 5 % (ref 0–7)
ERYTHROCYTE [DISTWIDTH] IN BLOOD BY AUTOMATED COUNT: 13.2 % (ref 11.5–14.5)
HCG SERPL QL: NEGATIVE
HCT VFR BLD AUTO: 33.5 % (ref 35–47)
HGB BLD-MCNC: 11.2 G/DL (ref 11.5–16)
IMM GRANULOCYTES # BLD AUTO: 0 K/UL (ref 0–0.04)
IMM GRANULOCYTES NFR BLD AUTO: 0 % (ref 0–0.5)
LYMPHOCYTES # BLD: 1.6 K/UL (ref 0.8–3.5)
LYMPHOCYTES NFR BLD: 31 % (ref 12–49)
MCH RBC QN AUTO: 31.5 PG (ref 26–34)
MCHC RBC AUTO-ENTMCNC: 33.4 G/DL (ref 30–36.5)
MCV RBC AUTO: 94.4 FL (ref 80–99)
MONOCYTES # BLD: 0.6 K/UL (ref 0–1)
MONOCYTES NFR BLD: 11 % (ref 5–13)
NEUTS SEG # BLD: 2.6 K/UL (ref 1.8–8)
NEUTS SEG NFR BLD: 52 % (ref 32–75)
NRBC # BLD: 0 K/UL (ref 0–0.01)
NRBC BLD-RTO: 0 PER 100 WBC
PLATELET # BLD AUTO: 221 K/UL (ref 150–400)
PMV BLD AUTO: 10.2 FL (ref 8.9–12.9)
RBC # BLD AUTO: 3.55 M/UL (ref 3.8–5.2)
SPECIMEN EXP DATE BLD: NORMAL
WBC # BLD AUTO: 5 K/UL (ref 3.6–11)

## 2024-11-21 PROCEDURE — 86850 RBC ANTIBODY SCREEN: CPT

## 2024-11-21 PROCEDURE — 36415 COLL VENOUS BLD VENIPUNCTURE: CPT

## 2024-11-21 PROCEDURE — 86900 BLOOD TYPING SEROLOGIC ABO: CPT

## 2024-11-21 PROCEDURE — 86901 BLOOD TYPING SEROLOGIC RH(D): CPT

## 2024-11-21 PROCEDURE — 85025 COMPLETE CBC W/AUTO DIFF WBC: CPT

## 2024-11-21 PROCEDURE — 84703 CHORIONIC GONADOTROPIN ASSAY: CPT

## 2024-11-21 ASSESSMENT — PAIN DESCRIPTION - LOCATION: LOCATION: ABDOMEN

## 2024-11-21 ASSESSMENT — PAIN SCALES - GENERAL: PAINLEVEL_OUTOF10: 3

## 2024-11-21 NOTE — DISCHARGE INSTRUCTIONS
Signed         [facility]                   [address] [place] [address]   MAIN OR                                  [phone]   MAIN PRE OP           [phone]                                                                                AMBULATORY PRE OP          [phone]  PRE-ADMISSION TESTING    [phone]   Surgery Date:  12/5/2024             Is surgery arrival time given by surgeon? NO  If “NO”, [facility] staff will call you between 3 and 7pm the day before your surgery with your arrival time. (If your surgery is on a Monday, we will call you the Friday before.)    Call [phone] after 7pm Monday-Friday if you did not receive this call.         INSTRUCTIONS BEFORE YOUR SURGERY   When You  Arrive Arrive at the 2nd Floor Admitting Desk on the day of your surgery.  Have your insurance card, photo ID,living will/advanced directive/POA (if applicable),  and any copayment (if needed)   Food   and   Drink NO food or drink after midnight the night before surgery    This means NO gum, mints, coffee, juice, food, etc.      You may drink WATER ONLY up until two (2) hours before your surgery. DO NOT ADD ANYTHING TO THIS WATER to avoid postponement of your surgery.     No alcohol (beer, wine, liquor) or marijuana 24 hours before and after surgery   Medications to   TAKE   Morning of Surgery MEDICATIONS TO TAKE THE MORNING OF SURGERY WITH A SIP OF WATER:   none   Medications  To  STOP      7 days before surgery Non-Steroidal anti-inflammatory Drugs (NSAID's): for example, Ibuprofen (Advil, Motrin), Naproxen (Aleve)  Aspirin, if taking for pain   Herbal supplements, vitamins, and fish oil  (Pain medications not listed above, including Tylenol may be taken)   Blood  Thinners If you take  Aspirin, Plavix, Coumadin, or any blood-thinning or anti-blood clot medicine, talk to the doctor who prescribed the medications for pre-operative instructions.  You will receive a

## 2024-11-21 NOTE — PERIOP NOTE
[facility]                   [address] [place] [address]   MAIN OR                                  [phone]   MAIN PRE OP           [phone]                                                                                AMBULATORY PRE OP          [phone]  PRE-ADMISSION TESTING    [phone]   Surgery Date:  12/5/2024       Is surgery arrival time given by surgeon? NO  If “NO”, [facility] staff will call you between 3 and 7pm the day before your surgery with your arrival time. (If your surgery is on a Monday, we will call you the Friday before.)    Call [phone] after 7pm Monday-Friday if you did not receive this call.    INSTRUCTIONS BEFORE YOUR SURGERY   When You  Arrive Arrive at the 2nd Floor Admitting Desk on the day of your surgery.  Have your insurance card, photo ID,living will/advanced directive/POA (if applicable),  and any copayment (if needed)   Food   and   Drink NO food or drink after midnight the night before surgery    This means NO gum, mints, coffee, juice, food, etc.     You may drink WATER ONLY up until two (2) hours before your surgery. DO NOT ADD ANYTHING TO THIS WATER to avoid postponement of your surgery.    No alcohol (beer, wine, liquor) or marijuana 24 hours before and after surgery   Medications to   TAKE   Morning of Surgery MEDICATIONS TO TAKE THE MORNING OF SURGERY WITH A SIP OF WATER:   none   Medications  To  STOP      7 days before surgery Non-Steroidal anti-inflammatory Drugs (NSAID's): for example, Ibuprofen (Advil, Motrin), Naproxen (Aleve)  Aspirin, if taking for pain   Herbal supplements, vitamins, and fish oil  (Pain medications not listed above, including Tylenol may be taken)   Blood  Thinners If you take  Aspirin, Plavix, Coumadin, or any blood-thinning or anti-blood clot medicine, talk to the doctor who prescribed the medications for pre-operative instructions.  You will receive a call from Pre Admission

## 2024-11-27 ENCOUNTER — TELEPHONE (OUTPATIENT)
Age: 54
End: 2024-11-27

## 2024-11-27 NOTE — TELEPHONE ENCOUNTER
Two patient identifier used    54 year old patient last seen in the office on 10/15/2024 for ae and is schedule for surgery on 12/5/2024     mallory 1    Surgeon Role   Cyndee Finn MD Primary   Néstor Serra MD Assisting    Procedure Laterality Anesthesia   ROBOTIC ASSISTED TOTAL LAPAROSCOPIC HYSTERECTOMY , BILATERAL SALPINGECTOMY, CYSTOSCOPY N/A General          Patient is calling to say that she is still bleeding since 11/6/2024 . Patient reports the flow is not has heavy , she is changing a regular tampon every 2 hours , mild cramping at 3 on the pain scale of 1-10 and passing pink nail size clots.    Patient has seen her CBC and is concerned about the results and that she is still bleeding.    Patient is taking the megace 40 mg as ordered    Please advise    Thank you

## 2024-11-27 NOTE — TELEPHONE ENCOUNTER
Cyndee Finn MD   to Me       11/27/24 12:16 PM  I saw her blood work from from her presurgery testing and it's a little lower, but ok. She should increase the megace to twice daily in preparation for her surgery if she's still bleeding.    Patient advised of work in MD Dr Finn recommendations and verbalized understanding.    Patient will call back prn    Patient verbalized understanding.

## 2024-11-29 ENCOUNTER — HOSPITAL ENCOUNTER (EMERGENCY)
Facility: HOSPITAL | Age: 54
Discharge: HOME | End: 2024-11-29
Attending: STUDENT IN AN ORGANIZED HEALTH CARE EDUCATION/TRAINING PROGRAM
Payer: COMMERCIAL

## 2024-11-29 ENCOUNTER — TELEPHONE (OUTPATIENT)
Age: 54
End: 2024-11-29

## 2024-11-29 VITALS
WEIGHT: 131 LBS | TEMPERATURE: 97.6 F | HEIGHT: 61 IN | RESPIRATION RATE: 16 BRPM | BODY MASS INDEX: 24.73 KG/M2 | SYSTOLIC BLOOD PRESSURE: 123 MMHG | HEART RATE: 73 BPM | DIASTOLIC BLOOD PRESSURE: 79 MMHG | OXYGEN SATURATION: 98 %

## 2024-11-29 DIAGNOSIS — N93.9 ABNORMAL VAGINAL BLEEDING: Primary | ICD-10-CM

## 2024-11-29 LAB
BASOPHILS # BLD: 0 K/UL (ref 0–0.1)
BASOPHILS NFR BLD: 1 % (ref 0–1)
COMMENT:: NORMAL
DIFFERENTIAL METHOD BLD: ABNORMAL
EOSINOPHIL # BLD: 0.2 K/UL (ref 0–0.4)
EOSINOPHIL NFR BLD: 3 % (ref 0–7)
ERYTHROCYTE [DISTWIDTH] IN BLOOD BY AUTOMATED COUNT: 13.2 % (ref 11.5–14.5)
HCT VFR BLD AUTO: 33.1 % (ref 35–47)
HGB BLD-MCNC: 11.1 G/DL (ref 11.5–16)
IMM GRANULOCYTES # BLD AUTO: 0 K/UL (ref 0–0.04)
IMM GRANULOCYTES NFR BLD AUTO: 0 % (ref 0–0.5)
LYMPHOCYTES # BLD: 1.7 K/UL (ref 0.8–3.5)
LYMPHOCYTES NFR BLD: 22 % (ref 12–49)
MCH RBC QN AUTO: 31.3 PG (ref 26–34)
MCHC RBC AUTO-ENTMCNC: 33.5 G/DL (ref 30–36.5)
MCV RBC AUTO: 93.2 FL (ref 80–99)
MONOCYTES # BLD: 0.9 K/UL (ref 0–1)
MONOCYTES NFR BLD: 12 % (ref 5–13)
NEUTS SEG # BLD: 4.8 K/UL (ref 1.8–8)
NEUTS SEG NFR BLD: 62 % (ref 32–75)
NRBC # BLD: 0 K/UL (ref 0–0.01)
NRBC BLD-RTO: 0 PER 100 WBC
PLATELET # BLD AUTO: 262 K/UL (ref 150–400)
PMV BLD AUTO: 9.9 FL (ref 8.9–12.9)
RBC # BLD AUTO: 3.55 M/UL (ref 3.8–5.2)
SPECIMEN HOLD: NORMAL
WBC # BLD AUTO: 7.7 K/UL (ref 3.6–11)

## 2024-11-29 PROCEDURE — 85025 COMPLETE CBC W/AUTO DIFF WBC: CPT

## 2024-11-29 PROCEDURE — 6370000000 HC RX 637 (ALT 250 FOR IP): Performed by: STUDENT IN AN ORGANIZED HEALTH CARE EDUCATION/TRAINING PROGRAM

## 2024-11-29 PROCEDURE — 99283 EMERGENCY DEPT VISIT LOW MDM: CPT

## 2024-11-29 RX ORDER — ACETAMINOPHEN 500 MG
1000 TABLET ORAL
Status: COMPLETED | OUTPATIENT
Start: 2024-11-29 | End: 2024-11-29

## 2024-11-29 RX ORDER — TRANEXAMIC ACID 650 MG/1
1300 TABLET ORAL 3 TIMES DAILY
Qty: 30 TABLET | Refills: 0 | Status: ON HOLD | OUTPATIENT
Start: 2024-11-29 | End: 2024-12-06 | Stop reason: HOSPADM

## 2024-11-29 RX ADMIN — ACETAMINOPHEN 1000 MG: 500 TABLET ORAL at 14:45

## 2024-11-29 ASSESSMENT — PAIN - FUNCTIONAL ASSESSMENT: PAIN_FUNCTIONAL_ASSESSMENT: 0-10

## 2024-11-29 ASSESSMENT — PAIN SCALES - GENERAL
PAINLEVEL_OUTOF10: 5
PAINLEVEL_OUTOF10: 3

## 2024-11-29 ASSESSMENT — ENCOUNTER SYMPTOMS
NAUSEA: 0
VOMITING: 0
SHORTNESS OF BREATH: 0

## 2024-11-29 ASSESSMENT — LIFESTYLE VARIABLES
HOW MANY STANDARD DRINKS CONTAINING ALCOHOL DO YOU HAVE ON A TYPICAL DAY: PATIENT DOES NOT DRINK
HOW OFTEN DO YOU HAVE A DRINK CONTAINING ALCOHOL: NEVER

## 2024-11-29 ASSESSMENT — PAIN DESCRIPTION - LOCATION: LOCATION: ABDOMEN;VAGINA

## 2024-11-29 ASSESSMENT — PAIN DESCRIPTION - DESCRIPTORS: DESCRIPTORS: CRAMPING

## 2024-11-29 NOTE — TELEPHONE ENCOUNTER
Two patient identifiers used    54 year old patient scheduled for surgery on 12/5/2024.    Patient is currently in the er.  Dr Iva Ochoa calling to speak to Dr. Finn .    Dr Ochoa advised that MD is not in the office today.    MD will check with the ob/gyn hospital ist.

## 2024-11-29 NOTE — ED TRIAGE NOTES
Pt presents to the ER today via triage.  Pt chief complaint is passing blood clots vaginally.  Pt has hysterectomy appointment Thursday.  PT states she is soaking through tampons and pads.  Pt states has been significant for approximately 2 hours.

## 2024-11-29 NOTE — ED PROVIDER NOTES
[facility] EMERGENCY DEPT  EMERGENCY DEPARTMENT ENCOUNTER      Pt Name: Pamela Olivera  MRN: [id number]  Birthdate 1970  Date of evaluation: 2024  Provider: Iva Ochoa MD    CHIEF COMPLAINT       Chief Complaint   Patient presents with    Vaginal Bleeding     Since nov 6         HISTORY OF PRESENT ILLNESS   (Location/Symptom, Timing/Onset, Context/Setting, Quality, Duration, Modifying Factors, Severity)  Note limiting factors.   53 yo female presented with increased vaginal bleeding. Patient has been evaluated for menorrhagia and is scheduled for a hysterectomy next week. She is on megace 40 mg bid which was recently adjusted 2 days ago. She reports worsening of bleeding since this morning. Denies shortness of breath, lightheadedness, palpitations, nausea or vomiting. Has intermittent abdominal cramps but is not worse than usual.    The history is provided by the patient. No  was used.         Review of External Medical Records:     Nursing Notes were reviewed.    REVIEW OF SYSTEMS    (2-9 systems for level 4, 10 or more for level 5)     Review of Systems   Constitutional:  Negative for fever.   Respiratory:  Negative for shortness of breath.    Cardiovascular:  Negative for chest pain and palpitations.   Gastrointestinal:  Negative for nausea and vomiting.   Genitourinary:  Positive for menstrual problem and vaginal bleeding.   Neurological:  Negative for dizziness and light-headedness.   All other systems reviewed and are negative.      Except as noted above the remainder of the review of systems was reviewed and negative.       PAST MEDICAL HISTORY     Past Medical History:   Diagnosis Date    Anxiety     Menorrhagia     Migraine     Overactive bladder     Polyostotic fibrous dysplasia of bone     head, skull and neck, right rib         SURGICAL HISTORY       Past Surgical History:   Procedure Laterality Date    BONE RESECTION, RIB Right      SECTION  [id number]

## 2024-12-05 ENCOUNTER — HOSPITAL ENCOUNTER (OUTPATIENT)
Facility: HOSPITAL | Age: 54
Setting detail: OBSERVATION
Discharge: HOME | End: 2024-12-06
Attending: STUDENT IN AN ORGANIZED HEALTH CARE EDUCATION/TRAINING PROGRAM | Admitting: STUDENT IN AN ORGANIZED HEALTH CARE EDUCATION/TRAINING PROGRAM
Payer: COMMERCIAL

## 2024-12-05 ENCOUNTER — ANESTHESIA (OUTPATIENT)
Facility: HOSPITAL | Age: 54
End: 2024-12-05
Payer: COMMERCIAL

## 2024-12-05 ENCOUNTER — ANESTHESIA EVENT (OUTPATIENT)
Facility: HOSPITAL | Age: 54
End: 2024-12-05
Payer: COMMERCIAL

## 2024-12-05 DIAGNOSIS — N93.9 ABNORMAL UTERINE BLEEDING: ICD-10-CM

## 2024-12-05 DIAGNOSIS — D25.1 INTRAMURAL LEIOMYOMA OF UTERUS: Primary | ICD-10-CM

## 2024-12-05 DIAGNOSIS — D25.9 UTERINE LEIOMYOMA, UNSPECIFIED LOCATION: ICD-10-CM

## 2024-12-05 LAB — HCG UR QL: NEGATIVE

## 2024-12-05 PROCEDURE — S2900 ROBOTIC SURGICAL SYSTEM: HCPCS | Performed by: STUDENT IN AN ORGANIZED HEALTH CARE EDUCATION/TRAINING PROGRAM

## 2024-12-05 PROCEDURE — G0378 HOSPITAL OBSERVATION PER HR: HCPCS

## 2024-12-05 PROCEDURE — 6360000002 HC RX W HCPCS: Performed by: STUDENT IN AN ORGANIZED HEALTH CARE EDUCATION/TRAINING PROGRAM

## 2024-12-05 PROCEDURE — 2500000003 HC RX 250 WO HCPCS: Performed by: STUDENT IN AN ORGANIZED HEALTH CARE EDUCATION/TRAINING PROGRAM

## 2024-12-05 PROCEDURE — 3600000009 HC SURGERY ROBOT BASE: Performed by: STUDENT IN AN ORGANIZED HEALTH CARE EDUCATION/TRAINING PROGRAM

## 2024-12-05 PROCEDURE — 88342 IMHCHEM/IMCYTCHM 1ST ANTB: CPT

## 2024-12-05 PROCEDURE — 81025 URINE PREGNANCY TEST: CPT

## 2024-12-05 PROCEDURE — 2580000003 HC RX 258: Performed by: ANESTHESIOLOGY

## 2024-12-05 PROCEDURE — 88307 TISSUE EXAM BY PATHOLOGIST: CPT

## 2024-12-05 PROCEDURE — 7100000000 HC PACU RECOVERY - FIRST 15 MIN: Performed by: STUDENT IN AN ORGANIZED HEALTH CARE EDUCATION/TRAINING PROGRAM

## 2024-12-05 PROCEDURE — 3600000019 HC SURGERY ROBOT ADDTL 15MIN: Performed by: STUDENT IN AN ORGANIZED HEALTH CARE EDUCATION/TRAINING PROGRAM

## 2024-12-05 PROCEDURE — 2500000003 HC RX 250 WO HCPCS: Performed by: NURSE ANESTHETIST, CERTIFIED REGISTERED

## 2024-12-05 PROCEDURE — 2580000003 HC RX 258: Performed by: STUDENT IN AN ORGANIZED HEALTH CARE EDUCATION/TRAINING PROGRAM

## 2024-12-05 PROCEDURE — 6360000002 HC RX W HCPCS: Performed by: ANESTHESIOLOGY

## 2024-12-05 PROCEDURE — 2580000003 HC RX 258: Performed by: NURSE ANESTHETIST, CERTIFIED REGISTERED

## 2024-12-05 PROCEDURE — 3700000000 HC ANESTHESIA ATTENDED CARE: Performed by: STUDENT IN AN ORGANIZED HEALTH CARE EDUCATION/TRAINING PROGRAM

## 2024-12-05 PROCEDURE — 6360000002 HC RX W HCPCS: Performed by: NURSE ANESTHETIST, CERTIFIED REGISTERED

## 2024-12-05 PROCEDURE — 2720000010 HC SURG SUPPLY STERILE: Performed by: STUDENT IN AN ORGANIZED HEALTH CARE EDUCATION/TRAINING PROGRAM

## 2024-12-05 PROCEDURE — C1765 ADHESION BARRIER: HCPCS | Performed by: STUDENT IN AN ORGANIZED HEALTH CARE EDUCATION/TRAINING PROGRAM

## 2024-12-05 PROCEDURE — 6370000000 HC RX 637 (ALT 250 FOR IP): Performed by: STUDENT IN AN ORGANIZED HEALTH CARE EDUCATION/TRAINING PROGRAM

## 2024-12-05 PROCEDURE — 7100000001 HC PACU RECOVERY - ADDTL 15 MIN: Performed by: STUDENT IN AN ORGANIZED HEALTH CARE EDUCATION/TRAINING PROGRAM

## 2024-12-05 PROCEDURE — 3700000001 HC ADD 15 MINUTES (ANESTHESIA): Performed by: STUDENT IN AN ORGANIZED HEALTH CARE EDUCATION/TRAINING PROGRAM

## 2024-12-05 PROCEDURE — 2709999900 HC NON-CHARGEABLE SUPPLY: Performed by: STUDENT IN AN ORGANIZED HEALTH CARE EDUCATION/TRAINING PROGRAM

## 2024-12-05 RX ORDER — ONDANSETRON 4 MG/1
4 TABLET, ORALLY DISINTEGRATING ORAL EVERY 8 HOURS PRN
Status: DISCONTINUED | OUTPATIENT
Start: 2024-12-05 | End: 2024-12-06 | Stop reason: HOSPADM

## 2024-12-05 RX ORDER — IBUPROFEN 200 MG
800 TABLET ORAL EVERY 8 HOURS
Status: DISCONTINUED | OUTPATIENT
Start: 2024-12-06 | End: 2024-12-06 | Stop reason: HOSPADM

## 2024-12-05 RX ORDER — SODIUM CHLORIDE, SODIUM LACTATE, POTASSIUM CHLORIDE, CALCIUM CHLORIDE 600; 310; 30; 20 MG/100ML; MG/100ML; MG/100ML; MG/100ML
INJECTION, SOLUTION INTRAVENOUS CONTINUOUS
Status: DISCONTINUED | OUTPATIENT
Start: 2024-12-05 | End: 2024-12-05 | Stop reason: HOSPADM

## 2024-12-05 RX ORDER — FAMOTIDINE 10 MG/ML
INJECTION, SOLUTION INTRAVENOUS
Status: DISCONTINUED | OUTPATIENT
Start: 2024-12-05 | End: 2024-12-05 | Stop reason: SDUPTHER

## 2024-12-05 RX ORDER — ACETAMINOPHEN 500 MG
1000 TABLET ORAL ONCE
Status: COMPLETED | OUTPATIENT
Start: 2024-12-05 | End: 2024-12-05

## 2024-12-05 RX ORDER — PROCHLORPERAZINE EDISYLATE 5 MG/ML
10 INJECTION INTRAMUSCULAR; INTRAVENOUS EVERY 6 HOURS PRN
Status: DISCONTINUED | OUTPATIENT
Start: 2024-12-05 | End: 2024-12-06 | Stop reason: HOSPADM

## 2024-12-05 RX ORDER — SODIUM CHLORIDE, SODIUM LACTATE, POTASSIUM CHLORIDE, CALCIUM CHLORIDE 600; 310; 30; 20 MG/100ML; MG/100ML; MG/100ML; MG/100ML
INJECTION, SOLUTION INTRAVENOUS CONTINUOUS
Status: DISCONTINUED | OUTPATIENT
Start: 2024-12-05 | End: 2024-12-06 | Stop reason: HOSPADM

## 2024-12-05 RX ORDER — ROCURONIUM BROMIDE 10 MG/ML
INJECTION, SOLUTION INTRAVENOUS
Status: DISCONTINUED | OUTPATIENT
Start: 2024-12-05 | End: 2024-12-05 | Stop reason: SDUPTHER

## 2024-12-05 RX ORDER — DOCUSATE SODIUM 100 MG/1
100 CAPSULE, LIQUID FILLED ORAL 2 TIMES DAILY
Status: DISCONTINUED | OUTPATIENT
Start: 2024-12-05 | End: 2024-12-06 | Stop reason: HOSPADM

## 2024-12-05 RX ORDER — LIDOCAINE HYDROCHLORIDE 20 MG/ML
INJECTION, SOLUTION EPIDURAL; INFILTRATION; INTRACAUDAL; PERINEURAL
Status: DISCONTINUED | OUTPATIENT
Start: 2024-12-05 | End: 2024-12-05 | Stop reason: SDUPTHER

## 2024-12-05 RX ORDER — ONDANSETRON 2 MG/ML
4 INJECTION INTRAMUSCULAR; INTRAVENOUS EVERY 6 HOURS PRN
Status: DISCONTINUED | OUTPATIENT
Start: 2024-12-05 | End: 2024-12-06 | Stop reason: HOSPADM

## 2024-12-05 RX ORDER — SODIUM CHLORIDE 0.9 % (FLUSH) 0.9 %
5-40 SYRINGE (ML) INJECTION PRN
Status: DISCONTINUED | OUTPATIENT
Start: 2024-12-05 | End: 2024-12-06 | Stop reason: HOSPADM

## 2024-12-05 RX ORDER — FENTANYL CITRATE 50 UG/ML
100 INJECTION, SOLUTION INTRAMUSCULAR; INTRAVENOUS
Status: DISCONTINUED | OUTPATIENT
Start: 2024-12-05 | End: 2024-12-05 | Stop reason: HOSPADM

## 2024-12-05 RX ORDER — PROPOFOL 10 MG/ML
INJECTION, EMULSION INTRAVENOUS
Status: DISCONTINUED | OUTPATIENT
Start: 2024-12-05 | End: 2024-12-05 | Stop reason: SDUPTHER

## 2024-12-05 RX ORDER — NALOXONE HYDROCHLORIDE 0.4 MG/ML
INJECTION, SOLUTION INTRAMUSCULAR; INTRAVENOUS; SUBCUTANEOUS PRN
Status: DISCONTINUED | OUTPATIENT
Start: 2024-12-05 | End: 2024-12-05 | Stop reason: HOSPADM

## 2024-12-05 RX ORDER — MIDAZOLAM HYDROCHLORIDE 1 MG/ML
INJECTION, SOLUTION INTRAMUSCULAR; INTRAVENOUS
Status: DISCONTINUED | OUTPATIENT
Start: 2024-12-05 | End: 2024-12-05 | Stop reason: SDUPTHER

## 2024-12-05 RX ORDER — SODIUM CHLORIDE 9 MG/ML
INJECTION, SOLUTION INTRAVENOUS PRN
Status: DISCONTINUED | OUTPATIENT
Start: 2024-12-05 | End: 2024-12-06 | Stop reason: HOSPADM

## 2024-12-05 RX ORDER — ONDANSETRON 2 MG/ML
4 INJECTION INTRAMUSCULAR; INTRAVENOUS
Status: DISCONTINUED | OUTPATIENT
Start: 2024-12-05 | End: 2024-12-05 | Stop reason: HOSPADM

## 2024-12-05 RX ORDER — SODIUM CHLORIDE 0.9 % (FLUSH) 0.9 %
5-40 SYRINGE (ML) INJECTION EVERY 12 HOURS SCHEDULED
Status: DISCONTINUED | OUTPATIENT
Start: 2024-12-05 | End: 2024-12-06 | Stop reason: HOSPADM

## 2024-12-05 RX ORDER — HYDROMORPHONE HYDROCHLORIDE 2 MG/ML
INJECTION, SOLUTION INTRAMUSCULAR; INTRAVENOUS; SUBCUTANEOUS
Status: DISCONTINUED | OUTPATIENT
Start: 2024-12-05 | End: 2024-12-05 | Stop reason: SDUPTHER

## 2024-12-05 RX ORDER — KETOROLAC TROMETHAMINE 30 MG/ML
30 INJECTION, SOLUTION INTRAMUSCULAR; INTRAVENOUS EVERY 6 HOURS
Status: DISCONTINUED | OUTPATIENT
Start: 2024-12-05 | End: 2024-12-06 | Stop reason: HOSPADM

## 2024-12-05 RX ORDER — SODIUM CHLORIDE 9 MG/ML
INJECTION, SOLUTION INTRAVENOUS
Status: DISCONTINUED | OUTPATIENT
Start: 2024-12-05 | End: 2024-12-05 | Stop reason: SDUPTHER

## 2024-12-05 RX ORDER — FENTANYL CITRATE 50 UG/ML
INJECTION, SOLUTION INTRAMUSCULAR; INTRAVENOUS
Status: DISCONTINUED | OUTPATIENT
Start: 2024-12-05 | End: 2024-12-05 | Stop reason: SDUPTHER

## 2024-12-05 RX ORDER — GABAPENTIN 300 MG/1
300 CAPSULE ORAL ONCE
Status: COMPLETED | OUTPATIENT
Start: 2024-12-05 | End: 2024-12-05

## 2024-12-05 RX ORDER — ACETAMINOPHEN 500 MG
1000 TABLET ORAL EVERY 8 HOURS SCHEDULED
Status: DISCONTINUED | OUTPATIENT
Start: 2024-12-05 | End: 2024-12-06 | Stop reason: HOSPADM

## 2024-12-05 RX ORDER — LIDOCAINE HYDROCHLORIDE 10 MG/ML
1 INJECTION, SOLUTION EPIDURAL; INFILTRATION; INTRACAUDAL; PERINEURAL
Status: DISCONTINUED | OUTPATIENT
Start: 2024-12-05 | End: 2024-12-05 | Stop reason: HOSPADM

## 2024-12-05 RX ORDER — MIDAZOLAM HYDROCHLORIDE 2 MG/2ML
2 INJECTION, SOLUTION INTRAMUSCULAR; INTRAVENOUS
Status: DISCONTINUED | OUTPATIENT
Start: 2024-12-05 | End: 2024-12-05 | Stop reason: HOSPADM

## 2024-12-05 RX ORDER — METRONIDAZOLE 500 MG/100ML
500 INJECTION, SOLUTION INTRAVENOUS ONCE
Status: COMPLETED | OUTPATIENT
Start: 2024-12-05 | End: 2024-12-05

## 2024-12-05 RX ORDER — ONDANSETRON 2 MG/ML
INJECTION INTRAMUSCULAR; INTRAVENOUS
Status: DISCONTINUED | OUTPATIENT
Start: 2024-12-05 | End: 2024-12-05 | Stop reason: SDUPTHER

## 2024-12-05 RX ORDER — BUPIVACAINE HYDROCHLORIDE 2.5 MG/ML
INJECTION, SOLUTION EPIDURAL; INFILTRATION; INTRACAUDAL PRN
Status: DISCONTINUED | OUTPATIENT
Start: 2024-12-05 | End: 2024-12-05 | Stop reason: ALTCHOICE

## 2024-12-05 RX ADMIN — SODIUM CHLORIDE, PRESERVATIVE FREE 10 ML: 5 INJECTION INTRAVENOUS at 21:38

## 2024-12-05 RX ADMIN — SODIUM CHLORIDE, POTASSIUM CHLORIDE, SODIUM LACTATE AND CALCIUM CHLORIDE: 600; 310; 30; 20 INJECTION, SOLUTION INTRAVENOUS at 15:07

## 2024-12-05 RX ADMIN — PROPOFOL 150 MG: 10 INJECTION, EMULSION INTRAVENOUS at 15:17

## 2024-12-05 RX ADMIN — ROCURONIUM BROMIDE 20 MG: 10 INJECTION, SOLUTION INTRAVENOUS at 16:30

## 2024-12-05 RX ADMIN — KETOROLAC TROMETHAMINE 30 MG: 30 INJECTION, SOLUTION INTRAMUSCULAR at 21:36

## 2024-12-05 RX ADMIN — ROCURONIUM BROMIDE 10 MG: 10 INJECTION, SOLUTION INTRAVENOUS at 17:50

## 2024-12-05 RX ADMIN — ACETAMINOPHEN 1000 MG: 500 TABLET ORAL at 09:59

## 2024-12-05 RX ADMIN — Medication 10 MG: at 15:37

## 2024-12-05 RX ADMIN — SUGAMMADEX 200 MG: 100 INJECTION, SOLUTION INTRAVENOUS at 18:49

## 2024-12-05 RX ADMIN — Medication 20 MG: at 15:13

## 2024-12-05 RX ADMIN — METRONIDAZOLE 500 MG: 500 INJECTION, SOLUTION INTRAVENOUS at 15:23

## 2024-12-05 RX ADMIN — HYDROMORPHONE HYDROCHLORIDE 1 MG: 2 INJECTION, SOLUTION INTRAMUSCULAR; INTRAVENOUS; SUBCUTANEOUS at 15:30

## 2024-12-05 RX ADMIN — SODIUM CHLORIDE, POTASSIUM CHLORIDE, SODIUM LACTATE AND CALCIUM CHLORIDE: 600; 310; 30; 20 INJECTION, SOLUTION INTRAVENOUS at 09:52

## 2024-12-05 RX ADMIN — SODIUM CHLORIDE: 9 INJECTION, SOLUTION INTRAVENOUS at 15:50

## 2024-12-05 RX ADMIN — ACETAMINOPHEN 1000 MG: 500 TABLET ORAL at 21:37

## 2024-12-05 RX ADMIN — PROPOFOL 150 MCG/KG/MIN: 10 INJECTION, EMULSION INTRAVENOUS at 15:18

## 2024-12-05 RX ADMIN — FENTANYL CITRATE 50 MCG: 50 INJECTION, SOLUTION INTRAMUSCULAR; INTRAVENOUS at 15:22

## 2024-12-05 RX ADMIN — ROCURONIUM BROMIDE 50 MG: 10 INJECTION, SOLUTION INTRAVENOUS at 15:17

## 2024-12-05 RX ADMIN — ONDANSETRON 4 MG: 2 INJECTION, SOLUTION INTRAMUSCULAR; INTRAVENOUS at 15:07

## 2024-12-05 RX ADMIN — LIDOCAINE HYDROCHLORIDE 60 MG: 20 INJECTION, SOLUTION EPIDURAL; INFILTRATION; INTRACAUDAL; PERINEURAL at 15:17

## 2024-12-05 RX ADMIN — FENTANYL CITRATE 100 MCG: 50 INJECTION, SOLUTION INTRAMUSCULAR; INTRAVENOUS at 15:07

## 2024-12-05 RX ADMIN — MIDAZOLAM HYDROCHLORIDE 2 MG: 1 INJECTION, SOLUTION INTRAMUSCULAR; INTRAVENOUS at 15:07

## 2024-12-05 RX ADMIN — FAMOTIDINE 20 MG: 10 INJECTION, SOLUTION INTRAVENOUS at 15:07

## 2024-12-05 RX ADMIN — HYDROMORPHONE HYDROCHLORIDE 0.5 MG: 1 INJECTION, SOLUTION INTRAMUSCULAR; INTRAVENOUS; SUBCUTANEOUS at 19:31

## 2024-12-05 RX ADMIN — MIDAZOLAM HYDROCHLORIDE 1 MG: 1 INJECTION, SOLUTION INTRAMUSCULAR; INTRAVENOUS at 15:15

## 2024-12-05 RX ADMIN — FENTANYL CITRATE 100 MCG: 50 INJECTION, SOLUTION INTRAMUSCULAR; INTRAVENOUS at 15:15

## 2024-12-05 RX ADMIN — SODIUM CHLORIDE, POTASSIUM CHLORIDE, SODIUM LACTATE AND CALCIUM CHLORIDE: 600; 310; 30; 20 INJECTION, SOLUTION INTRAVENOUS at 21:35

## 2024-12-05 RX ADMIN — GABAPENTIN 300 MG: 300 CAPSULE ORAL at 10:00

## 2024-12-05 RX ADMIN — DOCUSATE SODIUM 100 MG: 100 CAPSULE, LIQUID FILLED ORAL at 21:37

## 2024-12-05 RX ADMIN — ROCURONIUM BROMIDE 10 MG: 10 INJECTION, SOLUTION INTRAVENOUS at 18:13

## 2024-12-05 RX ADMIN — MIDAZOLAM HYDROCHLORIDE 1 MG: 1 INJECTION, SOLUTION INTRAMUSCULAR; INTRAVENOUS at 16:00

## 2024-12-05 RX ADMIN — WATER 2000 MG: 1 INJECTION INTRAMUSCULAR; INTRAVENOUS; SUBCUTANEOUS at 15:30

## 2024-12-05 ASSESSMENT — PAIN - FUNCTIONAL ASSESSMENT
PAIN_FUNCTIONAL_ASSESSMENT: 0-10
PAIN_FUNCTIONAL_ASSESSMENT: ACTIVITIES ARE NOT PREVENTED
PAIN_FUNCTIONAL_ASSESSMENT: ACTIVITIES ARE NOT PREVENTED

## 2024-12-05 ASSESSMENT — PAIN DESCRIPTION - ORIENTATION
ORIENTATION: RIGHT
ORIENTATION: MID
ORIENTATION: RIGHT
ORIENTATION: INNER

## 2024-12-05 ASSESSMENT — PAIN DESCRIPTION - LOCATION
LOCATION: ABDOMEN

## 2024-12-05 ASSESSMENT — PAIN SCALES - GENERAL
PAINLEVEL_OUTOF10: 0
PAINLEVEL_OUTOF10: 2
PAINLEVEL_OUTOF10: 0
PAINLEVEL_OUTOF10: 5
PAINLEVEL_OUTOF10: 6
PAINLEVEL_OUTOF10: 1

## 2024-12-05 ASSESSMENT — PAIN DESCRIPTION - DESCRIPTORS
DESCRIPTORS: ACHING;SORE
DESCRIPTORS: ACHING
DESCRIPTORS: ACHING;SORE
DESCRIPTORS: ACHING

## 2024-12-05 ASSESSMENT — PAIN DESCRIPTION - FREQUENCY: FREQUENCY: INTERMITTENT

## 2024-12-05 ASSESSMENT — PAIN DESCRIPTION - ONSET: ONSET: GRADUAL

## 2024-12-05 ASSESSMENT — PAIN DESCRIPTION - PAIN TYPE: TYPE: SURGICAL PAIN

## 2024-12-05 NOTE — ANESTHESIA PRE PROCEDURE
Department of Anesthesiology  Preprocedure Note       Name:  Pamela Olivera   Age:  54 y.o.  :  1970                                          MRN:  [id number]         Date:  2024      Surgeon: Surgeon(s):  Cyndee Finn MD Wiles, Howard O, MD    Procedure: Procedure(s):  ROBOTIC ASSISTED TOTAL LAPAROSCOPIC HYSTERECTOMY, BILATERAL SALPINGECTOMY, CYSTOSCOPY    Medications prior to admission:   Prior to Admission medications    Medication Sig Start Date End Date Taking? Authorizing Provider   tranexamic acid (LYSTEDA) 650 MG TABS tablet Take 2 tablets by mouth 3 times daily for 5 days 24 Yes Iva Ochoa MD   megestrol (MEGACE) 40 MG tablet Take 1 tablet by mouth daily 10/15/24  Yes Cyndee Finn MD   Multiple Vitamin (MULTIVITAMIN ADULT PO) Take 1 tablet by mouth daily Nature made, with iron    ProviderChandrika MD   L-Lysine 1000 MG TABS Take 1,000 mg by mouth Daily    ProviderChandrika MD   NONFORMULARY Take 1 each by mouth nightly as needed THC infused brownies    ProviderChandrika MD       Current medications:    Current Facility-Administered Medications   Medication Dose Route Frequency Provider Last Rate Last Admin   • lidocaine PF 1 % injection 1 mL  1 mL IntraDERmal Once PRN Marline Casey MD       • fentaNYL (SUBLIMAZE) injection 100 mcg  100 mcg IntraVENous Once PRN Marline Casey MD       • lactated ringers infusion   IntraVENous Continuous Marline Casey  mL/hr at 24 0952 New Bag at 24 0952   • midazolam PF (VERSED) injection 2 mg  2 mg IntraVENous Once PRN Marline Casey MD       • metroNIDAZOLE (FLAGYL) 500 mg in 0.9% NaCl 100 mL IVPB premix  500 mg IntraVENous Once Cyndee Finn MD       • ceFAZolin (ANCEF) 2,000 mg in sterile water 20 mL IV syringe  2,000 mg IntraVENous Once Cyndee iFnn MD           Allergies:    Allergies   Allergen Reactions   • Diphenhydramine Other (See Comments)     Increase of symptoms   • Erythromycin Hives

## 2024-12-05 NOTE — H&P
Exam: Normal chest wall and respirations. Clear to auscultation., Cardiovascular Exam: Regular rate and rhythm. S1, S2, no murmur, click, gallop, or rubs., Musculoskeletal Exam: Back is straight and non-tender, full ROM of upper and lower extremities., Skin: no rash or abnormalities, Neurologic Exam: Normal gait and speech, no tremor., and Psychiatric Exam: Alert and oriented, appropriate affect.      Assessment:     55yo  with AUB-L here for definitive therapy with RATLH-BSO, cystoscopy. Reviewed planned procedure, r/b/a and all questions answered.     Plan:      Discussed the risks of surgery including the risks of bleeding, infection, deep vein thrombosis, and surgical injuries to internal organs including but not limited to the bowel, bladder, ureters, rectum, female reproductive organs, blood vessels, muscles, nerves, possibly requiring additional surgery to repair or remove. Additional evaluation and/or treatment may be necessary depending on final findings or pathology results.  Symptoms may persist or recur.  The patient understands the risks; any and all questions were answered to the patient's satisfaction.    Signed By:  Cyndee Finn MD     2024

## 2024-12-05 NOTE — PERIOP NOTE
Updated pt and her friend of current delay with Dr Finn who is currently in another surgery. Estimated 45min to 1 hour delay when called in to the OR room.

## 2024-12-05 NOTE — OP NOTE
Operative Note      Patient: Pamela Olivera  YOB: 1970  MRN: [id number]    Date of Procedure: 12/5/2024    Pre-Op Diagnosis Codes:      * Uterine fibroid [D25.9]     * Abnormal uterine bleeding [N93.9]    Post-Op Diagnosis: Same       Procedure(s):  ROBOTIC ASSISTED TOTAL LAPAROSCOPIC HYSTERECTOMY, BILATERAL SALPINGOOPHROECTOMY, CYSTOSCOPY    Surgeon(s):  Cyndee Finn MD    Assistant:   Surgical Assistant: Amanda Morris; Kavita Soares    Anesthesia: General    Estimated Blood Loss (mL): less than 100     Complications: None    Specimens:   ID Type Source Tests Collected by Time Destination   1 : UTERUS,CERVIX, BILATERAL FALLOPIAN TUBE, BILATERAL OVARIES, weight 490 gr Tissue Uterus SURGICAL PATHOLOGY Cyndee Finn MD 12/5/2024 1755        Implants:  * No implants in log *      Drains:   [REMOVED] Urinary Catheter 12/05/24 2 Way;Bunn (Removed)   $ Urethral catheter insertion Inserted for procedure 12/05/24 1649   Catheter Indications Perioperative use for selected surgical procedures 12/05/24 1649   Site Assessment Pink;Moist 12/05/24 1649   Urine Color Yellow 12/05/24 1649   Urine Appearance Clear 12/05/24 1649   Collection Container Standard 12/05/24 1649   Catheter Best Practices  Tamper seal intact;Bag below bladder;Lack of dependent loop in tubing;Drainage bag less than half full 12/05/24 1649   Status Draining 12/05/24 1649       Findings:  Infection Present At Time Of Surgery (PATOS) (choose all levels that have infection present):  No infection present  Other Findings: normal appearing bilateral fallopian tubes and ovaries. Uterus enlarged with posterior uterine fibroid. Thin adhesions of omentum to anterior abdominal wall  Cystoscopy performed with bilateral ureteral jets visualized and bladder without defect    Detailed Description of Procedure:     The patient was prepped and draped in the dorsal lithotomy position after institution of general anesthesia. Position was confirmed for  safety.  Bunn catheter was placed using sterile technique to dependent drainage.    Attention was directed to the vagina.  Cervix was visualized. Cervix sounded to 11cm And  uterine manipulator was placed using a usual maneuvers without difficulty.    Attention was then directed to the abdomen.  An incision was made just above the umbilicus.  The Veress needle was then inserted and intraabdominal placement was confirmed with low opening pressures and saline drop test.  The abdominal cavity was insufflated to 15mmHg  and the Veress needle was removed.  The daVinci 8 mm camera port trocar was placed without difficulty using the 5 mm camera for direct visualization.  The camera was then used for direct visualization as three 8mm daVinci trocars were inserted, 2 on either side of the abdomen in line with the endoscope port. There were no injuries identified.The patient was placed in full trendelenburg and the daVinci bedsite cart was docked.  Monopolar scissors first right port, the caudiet in the far left port, and the fenestrated bipolar in the other left port.    The pelvis was visualized.  The uterus was as above. The adnexae were as above.  The cul-de-sac was normal.  Upper abdominal findings were unremarkable.    Bipolar and monopolar were used to take down omental adhesions.  The bipolar and monopolar were used to take down the right mesosalpinx, the right utero-ovarian ligament, the right round ligament, and the right broad ligament.  The incision was taken down anteriorly to the area of the bladder flap.  The identical process was accomplished on the left side.    The vessels were reached on both sides.  Cauterization was accomplished bilaterally and the uterine vessels were transected. Bilaterally, cautery and cutting was repeated in parallel to uterine vessels to lateralize from the cervix.    Next, using monopolar scissors, colpotomy was completed circumfertenially to free the specimen from the

## 2024-12-06 VITALS
OXYGEN SATURATION: 98 % | BODY MASS INDEX: 24.45 KG/M2 | HEART RATE: 89 BPM | WEIGHT: 129.41 LBS | SYSTOLIC BLOOD PRESSURE: 144 MMHG | RESPIRATION RATE: 18 BRPM | DIASTOLIC BLOOD PRESSURE: 90 MMHG | TEMPERATURE: 98.2 F

## 2024-12-06 PROCEDURE — 94761 N-INVAS EAR/PLS OXIMETRY MLT: CPT

## 2024-12-06 PROCEDURE — 96376 TX/PRO/DX INJ SAME DRUG ADON: CPT

## 2024-12-06 PROCEDURE — 6370000000 HC RX 637 (ALT 250 FOR IP): Performed by: STUDENT IN AN ORGANIZED HEALTH CARE EDUCATION/TRAINING PROGRAM

## 2024-12-06 PROCEDURE — 6360000002 HC RX W HCPCS: Performed by: STUDENT IN AN ORGANIZED HEALTH CARE EDUCATION/TRAINING PROGRAM

## 2024-12-06 PROCEDURE — 96374 THER/PROPH/DIAG INJ IV PUSH: CPT

## 2024-12-06 PROCEDURE — G0378 HOSPITAL OBSERVATION PER HR: HCPCS

## 2024-12-06 RX ORDER — ACETAMINOPHEN 500 MG
500 TABLET ORAL 4 TIMES DAILY PRN
Qty: 40 TABLET | Refills: 0 | Status: SHIPPED | OUTPATIENT
Start: 2024-12-06

## 2024-12-06 RX ORDER — OXYCODONE HYDROCHLORIDE 5 MG/1
5 TABLET ORAL EVERY 6 HOURS PRN
Qty: 12 TABLET | Refills: 0 | Status: SHIPPED | OUTPATIENT
Start: 2024-12-06 | End: 2024-12-09

## 2024-12-06 RX ORDER — NEOMYCIN/BACITRACIN/POLYMYXINB 3.5-400-5K
OINTMENT (GRAM) TOPICAL ONCE
Status: COMPLETED | OUTPATIENT
Start: 2024-12-06 | End: 2024-12-06

## 2024-12-06 RX ORDER — IBUPROFEN 600 MG/1
600 TABLET, FILM COATED ORAL 4 TIMES DAILY PRN
Qty: 40 TABLET | Refills: 0 | Status: SHIPPED | OUTPATIENT
Start: 2024-12-06

## 2024-12-06 RX ORDER — SENNOSIDES A AND B 8.6 MG/1
1 TABLET, FILM COATED ORAL 2 TIMES DAILY PRN
Qty: 60 TABLET | Refills: 11 | Status: SHIPPED | OUTPATIENT
Start: 2024-12-06 | End: 2025-12-06

## 2024-12-06 RX ADMIN — DOCUSATE SODIUM 100 MG: 100 CAPSULE, LIQUID FILLED ORAL at 09:16

## 2024-12-06 RX ADMIN — KETOROLAC TROMETHAMINE 30 MG: 30 INJECTION, SOLUTION INTRAMUSCULAR at 08:33

## 2024-12-06 RX ADMIN — KETOROLAC TROMETHAMINE 30 MG: 30 INJECTION, SOLUTION INTRAMUSCULAR at 01:11

## 2024-12-06 RX ADMIN — BACITRACIN ZINC, NEOMYCIN, POLYMYXIN B: 400; 3.5; 5 OINTMENT TOPICAL at 10:40

## 2024-12-06 RX ADMIN — ACETAMINOPHEN 1000 MG: 500 TABLET ORAL at 05:34

## 2024-12-06 ASSESSMENT — PAIN DESCRIPTION - DESCRIPTORS
DESCRIPTORS: ACHING
DESCRIPTORS: ACHING

## 2024-12-06 ASSESSMENT — PAIN SCALES - GENERAL
PAINLEVEL_OUTOF10: 0
PAINLEVEL_OUTOF10: 6
PAINLEVEL_OUTOF10: 5
PAINLEVEL_OUTOF10: 0
PAINLEVEL_OUTOF10: 7

## 2024-12-06 ASSESSMENT — PAIN - FUNCTIONAL ASSESSMENT: PAIN_FUNCTIONAL_ASSESSMENT: ACTIVITIES ARE NOT PREVENTED

## 2024-12-06 ASSESSMENT — PAIN DESCRIPTION - LOCATION
LOCATION: ABDOMEN

## 2024-12-06 ASSESSMENT — PAIN DESCRIPTION - ORIENTATION: ORIENTATION: RIGHT

## 2024-12-06 NOTE — CARE COORDINATION
12/6/24  9:12 AM    12/6/2024  9:12 AM      ICD-10-CM    1. Abnormal uterine bleeding  N93.9 CANCELED: Procedure Consent     CANCELED: Place INT pneumatic compression device     CANCELED: Procedure Consent     CANCELED: Place INT pneumatic compression device      2. Uterine leiomyoma, unspecified location  D25.9 CANCELED: Procedure Consent     CANCELED: Place INT pneumatic compression device     CANCELED: Procedure Consent     CANCELED: Place INT pneumatic compression device          General Risk Score: 3   Values used to calculate this score:    Points  Metrics       0        Age: 54       1        Hospital Admissions: 1       2        ED Visits: 2       0        Has Chronic Obstructive Pulmonary Disease: No       0        Has Diabetes: No       0        Has Congestive Heart Failure: No       0        Has Liver Disease: No       0        Has Depression: No       0        Current PCP: Caitlin Jade MD       0        Has Medicaid: No      CM reviewed EMR. No CM needs indicated at this time. Per IDR, estimated discharge date is 12/6/24. Providers, if needs arise, please consult case management.     DILCIA Cho  [facility] [place]      Available via [employer]

## 2024-12-06 NOTE — PROGRESS NOTES
Gynecology Progress Note    Admission day 2. No new complaints.  Pain controlled on current medication.  Voiding spontaneously. Tolerating house diet.    Vitals:  Blood pressure (!) 144/90, pulse 89, temperature 98.2 °F (36.8 °C), temperature source Oral, resp. rate 18, weight 58.7 kg (129 lb 6.6 oz), last menstrual period 2024, SpO2 98%.  Temp (24hrs), Av.2 °F (36.8 °C), Min:97.6 °F (36.4 °C), Max:99.1 °F (37.3 °C)          I and O: @io@    Exam:    Patient without distress.  Nontachycardic, normal rhythm.  Lungs CTA BL.              Abdomen soft,  nontender.  Incisions c/d/i              Lower extremities are negative for swelling, cords, or tenderness.    Lab results:   Recent Results (from the past 24 hour(s))   POC Pregnancy Urine Qual    Collection Time: 24  9:55 AM   Result Value Ref Range    Preg Test, Ur Negative NEG         Assessment and Plan:   aPmela Olivera is a 54 y.o. yo POD 1 s/p RATLH-BSO, cystoscopy. Doing well.  Patient stable.    Discharge with precautions. F/u in 2w.  Pt states no further questions.    MD Eliu Heredia OB/Gyn

## 2024-12-06 NOTE — ANESTHESIA POSTPROCEDURE EVALUATION
Department of Anesthesiology  Postprocedure Note    Patient: Pamela Olivera  MRN: [id number]  YOB: 1970  Date of evaluation: 12/5/2024    Procedure Summary       Date: 12/05/24 Room / Location: [facility] MAIN OR 6 / [facility] MAIN OR    Anesthesia Start: 1507 Anesthesia Stop: 1906    Procedure: ROBOTIC ASSISTED TOTAL LAPAROSCOPIC HYSTERECTOMY, BILATERAL SALPINGOOPHROECTOMY, CYSTOSCOPY (Abdomen/Perineum) Diagnosis:       Uterine fibroid      Abnormal uterine bleeding      (Uterine fibroid [D25.9])      (Abnormal uterine bleeding [N93.9])    Surgeons: Cyndee Finn MD Responsible Provider: Marline Casey MD    Anesthesia Type: general ASA Status: 2            Anesthesia Type: No value filed.    Yue Phase I: Yue Score: 10    Yue Phase II:      Anesthesia Post Evaluation    Patient location during evaluation: PACU  Patient participation: complete - patient participated  Level of consciousness: awake  Airway patency: patent  Nausea & Vomiting: no vomiting and no nausea  Cardiovascular status: hemodynamically stable  Respiratory status: acceptable  Hydration status: stable  Pain management: adequate    No notable events documented.

## 2024-12-19 NOTE — PROGRESS NOTES
Pamela Olivera is a 54 y.o. female presents for a problem visit.    Chief Complaint   Patient presents with    Post-Op Check     4 week   12/5/2024 surgery    Patient's last menstrual period was 11/21/2024.  Birth Control: none.  Last Pap: see report obtained 10/15/2024.    The patient is reporting having: no concern today      Examination chaperoned by Lidya Gracia MA.

## 2024-12-20 ENCOUNTER — OFFICE VISIT (OUTPATIENT)
Age: 54
End: 2024-12-20

## 2024-12-20 VITALS
SYSTOLIC BLOOD PRESSURE: 120 MMHG | BODY MASS INDEX: 24.55 KG/M2 | HEIGHT: 61 IN | HEART RATE: 80 BPM | WEIGHT: 130 LBS | DIASTOLIC BLOOD PRESSURE: 78 MMHG

## 2024-12-20 DIAGNOSIS — Z90.721 S/P HYSTERECTOMY WITH OOPHORECTOMY: Primary | ICD-10-CM

## 2024-12-20 DIAGNOSIS — Z90.710 S/P HYSTERECTOMY WITH OOPHORECTOMY: Primary | ICD-10-CM

## 2024-12-20 PROCEDURE — 99024 POSTOP FOLLOW-UP VISIT: CPT | Performed by: STUDENT IN AN ORGANIZED HEALTH CARE EDUCATION/TRAINING PROGRAM

## 2024-12-20 NOTE — PROGRESS NOTES
OB/GYN Problem VIsit    HPI  Pamela Olivera is a ,  54 y.o. female who presents for a post op visit.    2w s/p RATLH-BSO. No pain, N/V/D. No issues with bowel or bladder. No issues with her incisions.  No hot flashes.    Past Medical History:   Diagnosis Date    Anxiety     Menorrhagia     Migraine     Overactive bladder     Polyostotic fibrous dysplasia of bone     head, skull and neck, right rib     Past Surgical History:   Procedure Laterality Date    BONE RESECTION, RIB Right      SECTION  [id number]    HYSTERECTOMY (CERVIX STATUS UNKNOWN) N/A 2024    ROBOTIC ASSISTED TOTAL LAPAROSCOPIC HYSTERECTOMY, BILATERAL SALPINGOOPHROECTOMY, CYSTOSCOPY performed by Cyndee Finn MD at [facility] MAIN OR    IMPLANT BREAST SILICONE/EQ Bilateral     ORTHOPEDIC SURGERY      metal plate in head fibrous dysplasia    ORTHOPEDIC SURGERY      c 4 fusion    OTHER SURGICAL HISTORY      skull surgery with metal plate     Social History     Occupational History    Not on file   Tobacco Use    Smoking status: Former     Current packs/day: 0.00     Average packs/day: 1 pack/day for 31.4 years (31.4 ttl pk-yrs)     Types: Cigarettes     Start date: 1986     Quit date: 2017     Years since quittin.5    Smokeless tobacco: Never   Vaping Use    Vaping status: Never Used   Substance and Sexual Activity    Alcohol use: Yes     Alcohol/week: 3.0 standard drinks of alcohol     Types: 3 Standard drinks or equivalent per week     Comment: on weekends    Drug use: Not Currently     Types: Marijuana (Weed)     Comment: THC infused brownies nightly as needed    Sexual activity: Not Currently     Partners: Male     Birth control/protection: None     Family History   Problem Relation Age of Onset    Stroke Father         Joselin       Allergies   Allergen Reactions    Diphenhydramine Other (See Comments)     Increase of symptoms    Erythromycin Hives    Penicillins Hives    Tetracycline Hives    Aspirin

## 2025-01-16 NOTE — PROGRESS NOTES
Pamela Olivera is a 54 y.o. female presents for a problem visit.    Chief Complaint   Patient presents with    Follow-up   12/20/2024 S/P hysterectomy with oophorectomy    Patient's last menstrual period was 11/06/2024 (approximate).  Birth Control: none.  Last Pap: normal obtained 10/15/2024.    The patient is reporting having: no concern today    1. Have you been to the ER, urgent care clinic, or hospitalized since your last visit? No    2. Have you seen or consulted any other health care providers outside of the [facility] since your last visit? No    Examination chaperoned by Lidya Gracia MA.

## 2025-01-17 ENCOUNTER — OFFICE VISIT (OUTPATIENT)
Age: 55
End: 2025-01-17

## 2025-01-17 VITALS
BODY MASS INDEX: 25.3 KG/M2 | DIASTOLIC BLOOD PRESSURE: 79 MMHG | WEIGHT: 134 LBS | SYSTOLIC BLOOD PRESSURE: 124 MMHG | HEIGHT: 61 IN | HEART RATE: 76 BPM

## 2025-01-17 DIAGNOSIS — Z90.721 S/P HYSTERECTOMY WITH OOPHORECTOMY: Primary | ICD-10-CM

## 2025-01-17 DIAGNOSIS — Z90.710 S/P HYSTERECTOMY WITH OOPHORECTOMY: Primary | ICD-10-CM

## 2025-01-17 NOTE — PROGRESS NOTES
OB/GYN Problem VIsit    HPI  Pamela Olivera is a ,  54 y.o. female who presents for a post op visit.    4-year-old -0-2-1 6 weeks status post total robotic hysterectomy here for follow-up.  Pain minimal.  No bleeding.  He has started having daily hot flashes, 2 a day and are about a minute in duration.  Worked a shift as a  last week and felt sore thereafter.        Past Medical History:   Diagnosis Date    Anxiety     Menorrhagia     Migraine     Overactive bladder     Polyostotic fibrous dysplasia of bone     head, skull and neck, right rib     Past Surgical History:   Procedure Laterality Date    BONE RESECTION, RIB Right      SECTION  [id number]    HYSTERECTOMY (CERVIX STATUS UNKNOWN) N/A 2024    ROBOTIC ASSISTED TOTAL LAPAROSCOPIC HYSTERECTOMY, BILATERAL SALPINGOOPHROECTOMY, CYSTOSCOPY performed by Cyndee Finn MD at [facility] MAIN OR    IMPLANT BREAST SILICONE/EQ Bilateral     ORTHOPEDIC SURGERY      metal plate in head fibrous dysplasia    ORTHOPEDIC SURGERY      c 4 fusion    OTHER SURGICAL HISTORY      skull surgery with metal plate     Social History     Occupational History    Not on file   Tobacco Use    Smoking status: Former     Current packs/day: 0.00     Average packs/day: 1 pack/day for 31.4 years (31.4 ttl pk-yrs)     Types: Cigarettes     Start date: 1986     Quit date: 2017     Years since quittin.6    Smokeless tobacco: Never   Vaping Use    Vaping status: Never Used   Substance and Sexual Activity    Alcohol use: Yes     Alcohol/week: 3.0 standard drinks of alcohol     Types: 3 Standard drinks or equivalent per week     Comment: on weekends    Drug use: Not Currently     Types: Marijuana (Weed)     Comment: THC infused brownies nightly as needed    Sexual activity: Not Currently     Partners: Male     Birth control/protection: None     Family History   Problem Relation Age of Onset    Stroke Father         Joselin       Allergies

## 2025-04-09 ENCOUNTER — TELEPHONE (OUTPATIENT)
Age: 55
End: 2025-04-09

## 2025-04-09 NOTE — TELEPHONE ENCOUNTER
Two patient identifier used        54 year old patient last seen in the office on 1/17/2025.    Maye mcgee nurse from [facility] calling to confirm patient phone number and let the office know that the patient is participating in a program called [employer] , [employer] you through the insurance         The contact number should we need to speak to the team is [phone]      FYI

## 2025-05-04 NOTE — PROGRESS NOTES
Pamela Olivera is a 54 y.o. female who presents for annual exam.  Last seen by me in .    Reports feeling itchy all over, arms, behind the knees, and back of neck.  Having some hot flashes with awakenings.  No new dietary changes.  No new topicals, medications, or supplements.  Onset of symptoms more than 1 month.     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    Previously treated with lorazepam for anxiety and insomnia.    Sees GYN, [facility]. S/p EMILIANO/BSO, for fibroids/bleeding.     Had breast implant removal in , no mammogram since then.      BM better after fibroid surgery.  No prior colonoscopy.      Up to date on eye and dental.        Past Medical History:   Diagnosis Date    Anxiety     Menorrhagia     Migraine     Overactive bladder     Polyostotic fibrous dysplasia of bone     head, skull and neck, right rib       Family History   Problem Relation Age of Onset    Stroke Father         Joselin    Atrial Fibrillation Mother     Cancer Mother         Social History     Socioeconomic History    Marital status: Single     Spouse name: Not on file    Number of children: Not on file    Years of education: Not on file    Highest education level: Not on file   Occupational History    Not on file   Tobacco Use    Smoking status: Former     Current packs/day: 0.00     Average packs/day: 1 pack/day for 31.4 years (31.4 ttl pk-yrs)     Types: Cigarettes     Start date: 1986     Quit date: 2017     Years since quittin.9    Smokeless tobacco: Never   Vaping Use    Vaping status: Never Used   Substance and Sexual Activity    Alcohol use: Yes     Alcohol/week: 3.0 standard drinks of alcohol     Types: 3 Standard drinks or equivalent per week     Comment: on weekends    Drug use: Not Currently     Types: Marijuana (Weed)     Comment: THC infused brownies nightly as needed    Sexual activity: Not Currently     Partners: Male     Birth control/protection: None   Other

## 2025-05-05 ENCOUNTER — OFFICE VISIT (OUTPATIENT)
Age: 55
End: 2025-05-05
Payer: COMMERCIAL

## 2025-05-05 VITALS
SYSTOLIC BLOOD PRESSURE: 132 MMHG | RESPIRATION RATE: 20 BRPM | BODY MASS INDEX: 24.55 KG/M2 | TEMPERATURE: 97.5 F | HEIGHT: 61 IN | OXYGEN SATURATION: 98 % | DIASTOLIC BLOOD PRESSURE: 79 MMHG | WEIGHT: 130 LBS | HEART RATE: 70 BPM

## 2025-05-05 DIAGNOSIS — F41.9 ANXIETY: ICD-10-CM

## 2025-05-05 DIAGNOSIS — L29.9 PRURITIC CONDITION: ICD-10-CM

## 2025-05-05 DIAGNOSIS — N95.1 MENOPAUSAL HOT FLUSHES: ICD-10-CM

## 2025-05-05 DIAGNOSIS — Z12.11 SCREENING FOR COLON CANCER: ICD-10-CM

## 2025-05-05 DIAGNOSIS — Z00.00 ROUTINE MEDICAL EXAM: Primary | ICD-10-CM

## 2025-05-05 DIAGNOSIS — F51.01 PRIMARY INSOMNIA: ICD-10-CM

## 2025-05-05 DIAGNOSIS — E55.9 VITAMIN D DEFICIENCY: ICD-10-CM

## 2025-05-05 PROCEDURE — 99396 PREV VISIT EST AGE 40-64: CPT | Performed by: FAMILY MEDICINE

## 2025-05-05 RX ORDER — HALOBETASOL PROPIONATE 0.5 MG/G
CREAM TOPICAL
Qty: 15 G | Refills: 0 | Status: SHIPPED | OUTPATIENT
Start: 2025-05-05

## 2025-05-05 RX ORDER — LORAZEPAM 1 MG/1
.5-1 TABLET ORAL 2 TIMES DAILY PRN
Qty: 30 TABLET | Refills: 0 | Status: SHIPPED | OUTPATIENT
Start: 2025-05-05 | End: 2025-06-04

## 2025-05-05 RX ORDER — VENLAFAXINE HYDROCHLORIDE 37.5 MG/1
37.5 CAPSULE, EXTENDED RELEASE ORAL DAILY
Qty: 30 CAPSULE | Refills: 3 | Status: SHIPPED | OUTPATIENT
Start: 2025-05-05

## 2025-05-05 ASSESSMENT — PATIENT HEALTH QUESTIONNAIRE - PHQ9
SUM OF ALL RESPONSES TO PHQ QUESTIONS 1-9: 0
2. FEELING DOWN, DEPRESSED OR HOPELESS: NOT AT ALL
1. LITTLE INTEREST OR PLEASURE IN DOING THINGS: NOT AT ALL

## 2025-05-05 NOTE — PROGRESS NOTES
Have you been to the ER, urgent care clinic since your last visit?  Hospitalized since your last visit?   No.    Have you seen or consulted any other health care providers outside our system since your last visit?   No.    Have you had a mammogram?”   No.    Date of last Mammogram: 6/15/2022       “Have you had a colorectal cancer screening such as a colonoscopy/FIT/Cologuard?    No.    No colonoscopy on file  No cologuard on file  No FIT/FOBT on file   No flexible sigmoidoscopy on file

## 2025-05-06 DIAGNOSIS — E55.9 VITAMIN D DEFICIENCY: ICD-10-CM

## 2025-05-06 DIAGNOSIS — Z00.00 ROUTINE MEDICAL EXAM: ICD-10-CM

## 2025-05-09 LAB
BASOPHILS # BLD AUTO: 0.1 X10E3/UL (ref 0–0.2)
BASOPHILS NFR BLD AUTO: 1 %
EOSINOPHIL # BLD AUTO: 0.2 X10E3/UL (ref 0–0.4)
EOSINOPHIL NFR BLD AUTO: 4 %
ERYTHROCYTE [DISTWIDTH] IN BLOOD BY AUTOMATED COUNT: 15.9 % (ref 11.7–15.4)
HCT VFR BLD AUTO: 37.2 % (ref 34–46.6)
HGB BLD-MCNC: 11.9 G/DL (ref 11.1–15.9)
IMM GRANULOCYTES # BLD AUTO: 0 X10E3/UL (ref 0–0.1)
IMM GRANULOCYTES NFR BLD AUTO: 0 %
LYMPHOCYTES # BLD AUTO: 1.7 X10E3/UL (ref 0.7–3.1)
LYMPHOCYTES NFR BLD AUTO: 40 %
MCH RBC QN AUTO: 27.1 PG (ref 26.6–33)
MCHC RBC AUTO-ENTMCNC: 32 G/DL (ref 31.5–35.7)
MCV RBC AUTO: 85 FL (ref 79–97)
MONOCYTES # BLD AUTO: 0.5 X10E3/UL (ref 0.1–0.9)
MONOCYTES NFR BLD AUTO: 11 %
NEUTROPHILS # BLD AUTO: 1.9 X10E3/UL (ref 1.4–7)
NEUTROPHILS NFR BLD AUTO: 44 %
PLATELET # BLD AUTO: 268 X10E3/UL (ref 150–450)
RBC # BLD AUTO: 4.39 X10E6/UL (ref 3.77–5.28)
WBC # BLD AUTO: 4.3 X10E3/UL (ref 3.4–10.8)

## 2025-05-10 LAB
25(OH)D3+25(OH)D2 SERPL-MCNC: 28.7 NG/ML (ref 30–100)
ALBUMIN SERPL-MCNC: 4.6 G/DL (ref 3.8–4.9)
ALP SERPL-CCNC: 86 IU/L (ref 44–121)
ALT SERPL-CCNC: 11 IU/L (ref 0–32)
APPEARANCE UR: CLEAR
AST SERPL-CCNC: 19 IU/L (ref 0–40)
BILIRUB SERPL-MCNC: 0.6 MG/DL (ref 0–1.2)
BILIRUB UR QL STRIP: NEGATIVE
BUN SERPL-MCNC: 8 MG/DL (ref 6–24)
BUN/CREAT SERPL: 12 (ref 9–23)
CALCIUM SERPL-MCNC: 9.8 MG/DL (ref 8.7–10.2)
CHLORIDE SERPL-SCNC: 103 MMOL/L (ref 96–106)
CHOLEST SERPL-MCNC: 194 MG/DL (ref 100–199)
CO2 SERPL-SCNC: 22 MMOL/L (ref 20–29)
COLOR UR: YELLOW
CREAT SERPL-MCNC: 0.69 MG/DL (ref 0.57–1)
EGFRCR SERPLBLD CKD-EPI 2021: 104 ML/MIN/1.73
GLOBULIN SER CALC-MCNC: 2.2 G/DL (ref 1.5–4.5)
GLUCOSE SERPL-MCNC: 97 MG/DL (ref 70–99)
GLUCOSE UR QL STRIP: NEGATIVE
HBA1C MFR BLD: 5.2 % (ref 4.8–5.6)
HDLC SERPL-MCNC: 98 MG/DL
HGB UR QL STRIP: NEGATIVE
KETONES UR QL STRIP: NEGATIVE
LDLC SERPL CALC-MCNC: 88 MG/DL (ref 0–99)
LEUKOCYTE ESTERASE UR QL STRIP: NEGATIVE
NITRITE UR QL STRIP: NEGATIVE
PH UR STRIP: 5.5 [PH] (ref 5–7.5)
POTASSIUM SERPL-SCNC: 4.9 MMOL/L (ref 3.5–5.2)
PROT SERPL-MCNC: 6.8 G/DL (ref 6–8.5)
PROT UR QL STRIP: NEGATIVE
SODIUM SERPL-SCNC: 142 MMOL/L (ref 134–144)
SP GR UR STRIP: 1.02 (ref 1–1.03)
TRIGL SERPL-MCNC: 40 MG/DL (ref 0–149)
TSH SERPL DL<=0.005 MIU/L-ACNC: 1.33 UIU/ML (ref 0.45–4.5)
UROBILINOGEN UR STRIP-MCNC: 0.2 MG/DL (ref 0.2–1)
VLDLC SERPL CALC-MCNC: 8 MG/DL (ref 5–40)

## 2025-05-19 ENCOUNTER — HOSPITAL ENCOUNTER (OUTPATIENT)
Facility: HOSPITAL | Age: 55
Discharge: HOME | End: 2025-05-22
Payer: COMMERCIAL

## 2025-05-19 ENCOUNTER — TRANSCRIBE ORDERS (OUTPATIENT)
Facility: HOSPITAL | Age: 55
End: 2025-05-19

## 2025-05-19 VITALS — HEIGHT: 61 IN | BODY MASS INDEX: 24.55 KG/M2 | WEIGHT: 130 LBS

## 2025-05-19 DIAGNOSIS — Z12.31 OTHER SCREENING MAMMOGRAM: ICD-10-CM

## 2025-05-19 DIAGNOSIS — Z12.31 OTHER SCREENING MAMMOGRAM: Primary | ICD-10-CM

## 2025-05-19 PROCEDURE — 77067 SCR MAMMO BI INCL CAD: CPT

## 2025-05-19 PROCEDURE — 77063 BREAST TOMOSYNTHESIS BI: CPT

## 2025-05-20 ENCOUNTER — RESULTS FOLLOW-UP (OUTPATIENT)
Age: 55
End: 2025-05-20

## 2025-05-22 ENCOUNTER — RESULTS FOLLOW-UP (OUTPATIENT)
Age: 55
End: 2025-05-22

## 2025-05-27 DIAGNOSIS — N95.1 MENOPAUSAL HOT FLUSHES: ICD-10-CM

## 2025-05-27 RX ORDER — VENLAFAXINE HYDROCHLORIDE 37.5 MG/1
CAPSULE, EXTENDED RELEASE ORAL DAILY
Qty: 90 CAPSULE | Refills: 2 | Status: SHIPPED | OUTPATIENT
Start: 2025-05-27

## (undated) DEVICE — INSUFFLATION NEEDLE TO ESTABLISH PNEUMOPERITONEUM.: Brand: INSUFFLATION NEEDLE

## (undated) DEVICE — PAD POSITIONING WNG STD KIT W/BODY STRP LF DISP

## (undated) DEVICE — SUTURE SZ0 L36IN VIO ANTBACT ABSORB CT NDL HALF CIR PDS PLUS

## (undated) DEVICE — SOLUTION IRRIG 1000ML 0.9% SOD CHL USP POUR PLAS BTL

## (undated) DEVICE — AIRSEAL BIFURCATED SMOKE EVAC FILTERED TUBE SET: Brand: AIRSEAL

## (undated) DEVICE — CLICKLINE SCISSORS INSERT: Brand: CLICKLINE

## (undated) DEVICE — BARRIER ADH W3XL4IN UTER PELV ABSRB GYNECARE INTCEED

## (undated) DEVICE — AGENT HEMOSTATIC SURGIFLOW MATRIX KIT W/THROMBIN

## (undated) DEVICE — SUTURE STRATAFIX SPRL PDS + SZ 2-0 L9IN ABSRB VLT CT-1 SXPP1B456

## (undated) DEVICE — TIP COVER ACCESSORY

## (undated) DEVICE — STRIP,CLOSURE,WOUND,MEDI-STRIP,1/2X4: Brand: MEDLINE

## (undated) DEVICE — 3M™ IOBAN™ 2 ANTIMICROBIAL INCISE DRAPE 6650EZ: Brand: IOBAN™ 2

## (undated) DEVICE — CYSTO/BLADDER IRRIGATION SET, REGULATING CLAMP

## (undated) DEVICE — TAPE,CLOTH/SILK,CURAD,3"X10YD,LF,40/CS: Brand: CURAD

## (undated) DEVICE — APPLICATOR ENDOSCP L34CM W/ S STL CANN PLAS OBT STYL FOR

## (undated) DEVICE — ROBOTIC GYN-SFMC: Brand: MEDLINE INDUSTRIES, INC.

## (undated) DEVICE — SEAL

## (undated) DEVICE — SOLUTION IRRIG 1000ML STRL H2O USP PLAS POUR BTL

## (undated) DEVICE — MANIPULATOR UTER 3CM ULTEM PLAS CUP DELINEATOR

## (undated) DEVICE — SYRINGE MED 50ML LUERLOCK TIP

## (undated) DEVICE — SUTURE MONOCRYL + SZ 0 L36IN ABSRB VLT CT-1 L36MM 1/2 CIR TAPR MCP346H

## (undated) DEVICE — BANDAGE ADH W2XL4IN NITRL FAB STRP CURAD

## (undated) DEVICE — SOLUTION IV 1000ML 0.9% SOD CHL PH 5 INJ USP VIAFLX PLAS

## (undated) DEVICE — TISSUE RETRIEVAL SYSTEM: Brand: INZII RETRIEVAL SYSTEM

## (undated) DEVICE — SYSTEM SMK EVAC LAP TBNG FILTER HSNG BENT STYL PNK SEE CLR

## (undated) DEVICE — CANISTER, RIGID, 3000CC: Brand: MEDLINE INDUSTRIES, INC.

## (undated) DEVICE — ARM DRAPE

## (undated) DEVICE — BLADELESS OBTURATOR: Brand: WECK VISTA